# Patient Record
Sex: FEMALE | Race: WHITE | NOT HISPANIC OR LATINO | Employment: FULL TIME | URBAN - METROPOLITAN AREA
[De-identification: names, ages, dates, MRNs, and addresses within clinical notes are randomized per-mention and may not be internally consistent; named-entity substitution may affect disease eponyms.]

---

## 2019-07-17 ENCOUNTER — HOSPITAL ENCOUNTER (OUTPATIENT)
Dept: ULTRASOUND IMAGING | Facility: CLINIC | Age: 39
Discharge: HOME | End: 2019-07-17
Attending: ADVANCED PRACTICE MIDWIFE

## 2019-07-17 ENCOUNTER — PRENATAL OFFICE VISIT - HEALTHEAST (OUTPATIENT)
Dept: MIDWIFE SERVICES | Facility: CLINIC | Age: 39
End: 2019-07-17

## 2019-07-17 DIAGNOSIS — O09.529 SUPERVISION OF HIGH-RISK PREGNANCY OF ELDERLY MULTIGRAVIDA: ICD-10-CM

## 2019-07-17 DIAGNOSIS — O09.521 MULTIGRAVIDA OF ADVANCED MATERNAL AGE IN FIRST TRIMESTER: ICD-10-CM

## 2019-07-17 LAB
BASOPHILS # BLD AUTO: 0 THOU/UL (ref 0–0.2)
BASOPHILS NFR BLD AUTO: 0 % (ref 0–2)
EOSINOPHIL # BLD AUTO: 0.1 THOU/UL (ref 0–0.4)
EOSINOPHIL NFR BLD AUTO: 1 % (ref 0–6)
ERYTHROCYTE [DISTWIDTH] IN BLOOD BY AUTOMATED COUNT: 12.9 % (ref 11–14.5)
HCT VFR BLD AUTO: 38.7 % (ref 35–47)
HGB BLD-MCNC: 13.2 G/DL (ref 12–16)
HIV 1+2 AB+HIV1 P24 AG SERPL QL IA: NEGATIVE
LYMPHOCYTES # BLD AUTO: 2.3 THOU/UL (ref 0.8–4.4)
LYMPHOCYTES NFR BLD AUTO: 19 % (ref 20–40)
MCH RBC QN AUTO: 32.2 PG (ref 27–34)
MCHC RBC AUTO-ENTMCNC: 34.1 G/DL (ref 32–36)
MCV RBC AUTO: 94 FL (ref 80–100)
MONOCYTES # BLD AUTO: 1 THOU/UL (ref 0–0.9)
MONOCYTES NFR BLD AUTO: 8 % (ref 2–10)
NEUTROPHILS # BLD AUTO: 8.5 THOU/UL (ref 2–7.7)
NEUTROPHILS NFR BLD AUTO: 72 % (ref 50–70)
PLATELET # BLD AUTO: 240 THOU/UL (ref 140–440)
PMV BLD AUTO: 11.7 FL (ref 8.5–12.5)
RBC # BLD AUTO: 4.1 MILL/UL (ref 3.8–5.4)
WBC: 11.9 THOU/UL (ref 4–11)

## 2019-07-17 ASSESSMENT — MIFFLIN-ST. JEOR: SCORE: 1766.56

## 2019-07-18 LAB
ABO/RH(D): NORMAL
ABORH REPEAT: NORMAL
ANTIBODY SCREEN: NEGATIVE
HBA1C MFR BLD: 4.7 % (ref 4.2–6.1)
HBV SURFACE AG SERPL QL IA: NEGATIVE
HPV SOURCE: NORMAL
HUMAN PAPILLOMA VIRUS 16 DNA: NEGATIVE
HUMAN PAPILLOMA VIRUS 18 DNA: NEGATIVE
HUMAN PAPILLOMA VIRUS FINAL DIAGNOSIS: NORMAL
HUMAN PAPILLOMA VIRUS OTHER HR: NEGATIVE
RUBV IGG SERPL QL IA: POSITIVE
SPECIMEN DESCRIPTION: NORMAL
T PALLIDUM AB SER QL: NEGATIVE

## 2019-07-19 LAB — BACTERIA SPEC CULT: NORMAL

## 2019-07-29 ENCOUNTER — PRENATAL OFFICE VISIT - HEALTHEAST (OUTPATIENT)
Dept: MIDWIFE SERVICES | Facility: CLINIC | Age: 39
End: 2019-07-29

## 2019-07-29 ENCOUNTER — RECORDS - HEALTHEAST (OUTPATIENT)
Dept: ADMINISTRATIVE | Facility: OTHER | Age: 39
End: 2019-07-29

## 2019-07-29 DIAGNOSIS — O09.529 SUPERVISION OF HIGH-RISK PREGNANCY OF ELDERLY MULTIGRAVIDA: ICD-10-CM

## 2019-08-02 ENCOUNTER — AMBULATORY - HEALTHEAST (OUTPATIENT)
Dept: MIDWIFE SERVICES | Facility: CLINIC | Age: 39
End: 2019-08-02

## 2019-08-02 DIAGNOSIS — Z13.79 GENETIC SCREENING: ICD-10-CM

## 2019-08-08 ENCOUNTER — COMMUNICATION - HEALTHEAST (OUTPATIENT)
Dept: ADMINISTRATIVE | Facility: CLINIC | Age: 39
End: 2019-08-08

## 2019-08-20 ENCOUNTER — PRENATAL OFFICE VISIT - HEALTHEAST (OUTPATIENT)
Dept: MIDWIFE SERVICES | Facility: CLINIC | Age: 39
End: 2019-08-20

## 2019-08-20 DIAGNOSIS — O09.529 SUPERVISION OF HIGH-RISK PREGNANCY OF ELDERLY MULTIGRAVIDA: ICD-10-CM

## 2019-08-20 DIAGNOSIS — Z13.79 GENETIC SCREENING: ICD-10-CM

## 2019-08-20 ASSESSMENT — MIFFLIN-ST. JEOR: SCORE: 1788.79

## 2019-08-21 ENCOUNTER — COMMUNICATION - HEALTHEAST (OUTPATIENT)
Dept: MIDWIFE SERVICES | Facility: CLINIC | Age: 39
End: 2019-08-21

## 2019-08-21 DIAGNOSIS — O09.529 SUPERVISION OF HIGH-RISK PREGNANCY OF ELDERLY MULTIGRAVIDA: ICD-10-CM

## 2019-08-24 LAB
# FETUSES US: NORMAL
AFP MOM SERPL: 0.73
AFP SERPL-MCNC: 15 NG/ML
AGE - REPORTED: 37.6 YR
CURRENT SMOKER: NO
FAMILY MEMBER DISEASES HX: NO
GA METHOD: NORMAL
GA: NORMAL WK
IDDM PATIENT QL: NO
INTEGRATED SCN PATIENT-IMP: NORMAL
SPECIMEN DRAWN SERPL: NORMAL

## 2019-09-18 ENCOUNTER — COMMUNICATION - HEALTHEAST (OUTPATIENT)
Dept: ADMINISTRATIVE | Facility: CLINIC | Age: 39
End: 2019-09-18

## 2019-09-24 ENCOUNTER — RECORDS - HEALTHEAST (OUTPATIENT)
Dept: ADMINISTRATIVE | Facility: OTHER | Age: 39
End: 2019-09-24

## 2019-09-25 ENCOUNTER — PRENATAL OFFICE VISIT - HEALTHEAST (OUTPATIENT)
Dept: MIDWIFE SERVICES | Facility: CLINIC | Age: 39
End: 2019-09-25

## 2019-09-25 DIAGNOSIS — E66.3 OVERWEIGHT: ICD-10-CM

## 2019-09-25 DIAGNOSIS — O09.529 SUPERVISION OF HIGH-RISK PREGNANCY OF ELDERLY MULTIGRAVIDA: ICD-10-CM

## 2019-09-25 ASSESSMENT — MIFFLIN-ST. JEOR: SCORE: 1802.39

## 2019-09-26 ENCOUNTER — AMBULATORY - HEALTHEAST (OUTPATIENT)
Dept: MIDWIFE SERVICES | Facility: CLINIC | Age: 39
End: 2019-09-26

## 2019-10-22 ENCOUNTER — PRENATAL OFFICE VISIT - HEALTHEAST (OUTPATIENT)
Dept: MIDWIFE SERVICES | Facility: CLINIC | Age: 39
End: 2019-10-22

## 2019-10-22 DIAGNOSIS — Z67.91 RH NEGATIVE STATE IN ANTEPARTUM PERIOD: ICD-10-CM

## 2019-10-22 DIAGNOSIS — O09.529 SUPERVISION OF HIGH-RISK PREGNANCY OF ELDERLY MULTIGRAVIDA: ICD-10-CM

## 2019-10-22 DIAGNOSIS — O26.899 RH NEGATIVE STATE IN ANTEPARTUM PERIOD: ICD-10-CM

## 2019-10-22 DIAGNOSIS — E66.3 OVERWEIGHT: ICD-10-CM

## 2019-10-22 ASSESSMENT — MIFFLIN-ST. JEOR: SCORE: 1793.32

## 2019-11-22 ENCOUNTER — PRENATAL OFFICE VISIT - HEALTHEAST (OUTPATIENT)
Dept: MIDWIFE SERVICES | Facility: CLINIC | Age: 39
End: 2019-11-22

## 2019-11-22 DIAGNOSIS — M54.9 BACK PAIN AFFECTING PREGNANCY IN SECOND TRIMESTER: ICD-10-CM

## 2019-11-22 DIAGNOSIS — O26.899 RH NEGATIVE STATE IN ANTEPARTUM PERIOD: ICD-10-CM

## 2019-11-22 DIAGNOSIS — Z67.91 RH NEGATIVE STATE IN ANTEPARTUM PERIOD: ICD-10-CM

## 2019-11-22 DIAGNOSIS — O09.529 SUPERVISION OF HIGH-RISK PREGNANCY OF ELDERLY MULTIGRAVIDA: ICD-10-CM

## 2019-11-22 DIAGNOSIS — O99.891 BACK PAIN AFFECTING PREGNANCY IN SECOND TRIMESTER: ICD-10-CM

## 2019-11-22 LAB
FASTING STATUS PATIENT QL REPORTED: NORMAL
GLUCOSE 1H P 50 G GLC PO SERPL-MCNC: 112 MG/DL (ref 70–139)
HGB BLD-MCNC: 11.5 G/DL (ref 12–16)

## 2019-11-22 ASSESSMENT — MIFFLIN-ST. JEOR: SCORE: 1816

## 2019-11-23 LAB
ANTIBODY SCREEN: NEGATIVE
T PALLIDUM AB SER QL: NEGATIVE

## 2019-12-19 ENCOUNTER — PRENATAL OFFICE VISIT - HEALTHEAST (OUTPATIENT)
Dept: MIDWIFE SERVICES | Facility: CLINIC | Age: 39
End: 2019-12-19

## 2019-12-19 DIAGNOSIS — O09.529 SUPERVISION OF HIGH-RISK PREGNANCY OF ELDERLY MULTIGRAVIDA: ICD-10-CM

## 2019-12-19 ASSESSMENT — MIFFLIN-ST. JEOR: SCORE: 1823.71

## 2019-12-26 ENCOUNTER — COMMUNICATION - HEALTHEAST (OUTPATIENT)
Dept: MIDWIFE SERVICES | Facility: CLINIC | Age: 39
End: 2019-12-26

## 2020-01-02 ENCOUNTER — PRENATAL OFFICE VISIT - HEALTHEAST (OUTPATIENT)
Dept: MIDWIFE SERVICES | Facility: CLINIC | Age: 40
End: 2020-01-02

## 2020-01-02 DIAGNOSIS — O09.529 SUPERVISION OF HIGH-RISK PREGNANCY OF ELDERLY MULTIGRAVIDA: ICD-10-CM

## 2020-01-02 ASSESSMENT — EDINBURGH POSTNATAL DEPRESSION SCALE (EPDS): TOTAL SCORE: 1

## 2020-01-02 ASSESSMENT — MIFFLIN-ST. JEOR: SCORE: 1821.89

## 2020-01-23 ENCOUNTER — PRENATAL OFFICE VISIT - HEALTHEAST (OUTPATIENT)
Dept: MIDWIFE SERVICES | Facility: CLINIC | Age: 40
End: 2020-01-23

## 2020-01-23 DIAGNOSIS — O09.529 SUPERVISION OF HIGH-RISK PREGNANCY OF ELDERLY MULTIGRAVIDA: ICD-10-CM

## 2020-01-23 LAB — HGB BLD-MCNC: 11.3 G/DL (ref 12–16)

## 2020-01-23 ASSESSMENT — MIFFLIN-ST. JEOR: SCORE: 1846.38

## 2020-01-25 LAB
ALLERGIC TO PENICILLIN: NO
GP B STREP DNA SPEC QL NAA+PROBE: NEGATIVE

## 2020-01-30 ENCOUNTER — PRENATAL OFFICE VISIT - HEALTHEAST (OUTPATIENT)
Dept: MIDWIFE SERVICES | Facility: CLINIC | Age: 40
End: 2020-01-30

## 2020-01-30 DIAGNOSIS — O09.529 SUPERVISION OF HIGH-RISK PREGNANCY OF ELDERLY MULTIGRAVIDA: ICD-10-CM

## 2020-01-30 ASSESSMENT — MIFFLIN-ST. JEOR: SCORE: 1844.11

## 2020-02-06 ENCOUNTER — PRENATAL OFFICE VISIT - HEALTHEAST (OUTPATIENT)
Dept: MIDWIFE SERVICES | Facility: CLINIC | Age: 40
End: 2020-02-06

## 2020-02-06 DIAGNOSIS — O09.529 SUPERVISION OF HIGH-RISK PREGNANCY OF ELDERLY MULTIGRAVIDA: ICD-10-CM

## 2020-02-06 ASSESSMENT — MIFFLIN-ST. JEOR: SCORE: 1858.18

## 2020-02-13 ENCOUNTER — PRENATAL OFFICE VISIT - HEALTHEAST (OUTPATIENT)
Dept: MIDWIFE SERVICES | Facility: CLINIC | Age: 40
End: 2020-02-13

## 2020-02-13 DIAGNOSIS — O48.0 POST-TERM PREGNANCY, 40-42 WEEKS OF GESTATION: ICD-10-CM

## 2020-02-13 DIAGNOSIS — O09.529 SUPERVISION OF HIGH-RISK PREGNANCY OF ELDERLY MULTIGRAVIDA: ICD-10-CM

## 2020-02-13 ASSESSMENT — MIFFLIN-ST. JEOR: SCORE: 1848.65

## 2020-02-18 ENCOUNTER — PRENATAL OFFICE VISIT - HEALTHEAST (OUTPATIENT)
Dept: MIDWIFE SERVICES | Facility: CLINIC | Age: 40
End: 2020-02-18

## 2020-02-18 ENCOUNTER — HOSPITAL ENCOUNTER (OUTPATIENT)
Dept: ULTRASOUND IMAGING | Facility: CLINIC | Age: 40
Discharge: HOME | End: 2020-02-18
Attending: ADVANCED PRACTICE MIDWIFE

## 2020-02-18 DIAGNOSIS — O48.0 POST-TERM PREGNANCY, 40-42 WEEKS OF GESTATION: ICD-10-CM

## 2020-02-18 DIAGNOSIS — O09.529 SUPERVISION OF HIGH-RISK PREGNANCY OF ELDERLY MULTIGRAVIDA: ICD-10-CM

## 2020-02-18 ASSESSMENT — MIFFLIN-ST. JEOR: SCORE: 1851.82

## 2020-02-20 ENCOUNTER — COMMUNICATION - HEALTHEAST (OUTPATIENT)
Dept: OBGYN | Facility: CLINIC | Age: 40
End: 2020-02-20

## 2020-02-29 ENCOUNTER — COMMUNICATION - HEALTHEAST (OUTPATIENT)
Dept: MIDWIFE SERVICES | Facility: CLINIC | Age: 40
End: 2020-02-29

## 2020-03-02 ENCOUNTER — COMMUNICATION - HEALTHEAST (OUTPATIENT)
Dept: MIDWIFE SERVICES | Facility: CLINIC | Age: 40
End: 2020-03-02

## 2020-03-02 ENCOUNTER — COMMUNICATION - RIVER FALLS (OUTPATIENT)
Dept: FAMILY MEDICINE | Facility: CLINIC | Age: 40
End: 2020-03-02

## 2020-03-03 ENCOUNTER — RECORDS - HEALTHEAST (OUTPATIENT)
Dept: ADMINISTRATIVE | Facility: OTHER | Age: 40
End: 2020-03-03

## 2020-03-03 ENCOUNTER — COMMUNICATION - HEALTHEAST (OUTPATIENT)
Dept: MIDWIFE SERVICES | Facility: CLINIC | Age: 40
End: 2020-03-03

## 2020-03-04 ENCOUNTER — COMMUNICATION - HEALTHEAST (OUTPATIENT)
Dept: MIDWIFE SERVICES | Facility: CLINIC | Age: 40
End: 2020-03-04

## 2020-03-05 ENCOUNTER — COMMUNICATION - HEALTHEAST (OUTPATIENT)
Dept: MIDWIFE SERVICES | Facility: CLINIC | Age: 40
End: 2020-03-05

## 2020-03-05 ENCOUNTER — OFFICE VISIT - HEALTHEAST (OUTPATIENT)
Dept: MIDWIFE SERVICES | Facility: CLINIC | Age: 40
End: 2020-03-05

## 2020-03-05 RX ORDER — DIAPER,BRIEF,ADULT, DISPOSABLE
1 EACH MISCELLANEOUS DAILY
Status: SHIPPED | COMMUNITY
Start: 2020-03-05 | End: 2022-10-21

## 2020-03-05 RX ORDER — PYRIDOXINE HCL (VITAMIN B6) 25 MG
50 TABLET ORAL DAILY
Status: SHIPPED | COMMUNITY
Start: 2020-03-05 | End: 2022-10-21

## 2020-03-05 ASSESSMENT — EDINBURGH POSTNATAL DEPRESSION SCALE (EPDS): TOTAL SCORE: 0

## 2020-03-05 ASSESSMENT — MIFFLIN-ST. JEOR: SCORE: 1763.37

## 2020-03-06 ENCOUNTER — COMMUNICATION - RIVER FALLS (OUTPATIENT)
Dept: FAMILY MEDICINE | Facility: CLINIC | Age: 40
End: 2020-03-06

## 2020-03-10 ENCOUNTER — COMMUNICATION - HEALTHEAST (OUTPATIENT)
Dept: MIDWIFE SERVICES | Facility: CLINIC | Age: 40
End: 2020-03-10

## 2020-03-30 ENCOUNTER — COMMUNICATION - RIVER FALLS (OUTPATIENT)
Dept: FAMILY MEDICINE | Facility: CLINIC | Age: 40
End: 2020-03-30

## 2020-04-02 ENCOUNTER — OFFICE VISIT - HEALTHEAST (OUTPATIENT)
Dept: MIDWIFE SERVICES | Facility: CLINIC | Age: 40
End: 2020-04-02

## 2020-04-02 DIAGNOSIS — Z30.8 ENCOUNTER FOR OTHER CONTRACEPTIVE MANAGEMENT: ICD-10-CM

## 2020-04-02 DIAGNOSIS — Z67.91 RH NEGATIVE, ANTEPARTUM: ICD-10-CM

## 2020-04-02 DIAGNOSIS — O26.899 RH NEGATIVE, ANTEPARTUM: ICD-10-CM

## 2020-04-02 ASSESSMENT — ANXIETY QUESTIONNAIRES
3. WORRYING TOO MUCH ABOUT DIFFERENT THINGS: NOT AT ALL
4. TROUBLE RELAXING: NOT AT ALL
1. FEELING NERVOUS, ANXIOUS, OR ON EDGE: NOT AT ALL
5. BEING SO RESTLESS THAT IT IS HARD TO SIT STILL: NOT AT ALL
7. FEELING AFRAID AS IF SOMETHING AWFUL MIGHT HAPPEN: NOT AT ALL
6. BECOMING EASILY ANNOYED OR IRRITABLE: NOT AT ALL
GAD7 TOTAL SCORE: 0
2. NOT BEING ABLE TO STOP OR CONTROL WORRYING: NOT AT ALL

## 2020-04-02 ASSESSMENT — EDINBURGH POSTNATAL DEPRESSION SCALE (EPDS): TOTAL SCORE: 0

## 2021-05-28 ASSESSMENT — ANXIETY QUESTIONNAIRES: GAD7 TOTAL SCORE: 0

## 2021-05-30 NOTE — PROGRESS NOTES
PRENATAL VISIT   FIRST OBSTETRICAL EXAM - OB    Assessment / Impression     First prenatal visit at 10w3d by LMP     AMA  Hx LGA infant    Plan:     - IOB labs drawn., Declines GC/CT screening,  Pap smear screening performed today and hgbA1C  -Pt is not interested in drawing lead level.  -Patient is not interested in waterbirth. Hep C not drawn today.  -Reviewed prenatal care schedule and discussed routine OB visits versus problem visits and referrals. Also discussed use of ultrasound in pregnancy.  -ordered early US; reviewed dating. Dating by LMP.   -Optimal nutrition and weight gain discussed. Pregnancy weight gain of 11-20 lbs (BMI 30.0 or 34.9) encouraged.   -Advanced Maternal Age (35-39 years of age), advised on options for ultrasounds and genetic screening. Accepts all of the above offered.  -Reviewed importance of regular exercise in pregnancy and help with low back pain and other pregnancy symptoms.   -Discussed importance of taking aprenatal vitamin with the goal of having 400 mcg of folic acid. Additionally taking a Vitamin D supplement (1,000-2,000 IU/day) and an Omega 3/fish oil/DHA is beneficial.   -Anticipatory guidance for common pregnancy questions and concerns reviewed.   -Danger s/sx for this trimester reviewed with patient.  -Reviewed genetic carrier screening. Patient declines: all.  -Reviewed genetic aneuploidy screening options. Patient  accepts: NIPTS. Encouraged to call insurance to verify coverage.  -Reviewed MyChart, lab results, and how lab results are disseminated.   -IOB packet given and reviewed with patient, discussed standards of care, scope of practice, clinic and hospital settings, also reviewed clinic versus emergency phone number.   -Discussed [facility], policies, and recommendations regarding breastfeeding as well as professional and community support. Discussed the benefits of breastfeeding including: decreased childhood obesity or diabetes, decreased recurrence  "of ear infections, and decreased chance of hospitalization for respiratory conditions  Additionally, giving  formula instead of breast milk can affect the mother's supply. And formula alters the natural growth of good bacteria in the 's stomach.   -CNM services and hospital options reviewed; emergency and scheduling phone numbers given to patient.  -Return to clinic 4-6 weeks    Total time spent with patient: 40 minutes, >50% time spent counseling and coordinating care.    Subjective:      Mar Kaur is a 39 y.o.  here today for her First Obstetrical Exam.  She is new to [facility] and [facility]. This is a planned pregnancy.  Her cycles are regular and come every 28-30 days.  She is feeling well.  First pregnancy was uncomplicated.  She had spontaneous labor, used an epidural and \"got stuck\" at advanced dilation so pitocin was used.  Her birth was uncomplicated.  Believes she had undiagnosed and untreated PPA.  Notes she is watchful and aware of sxms and has much more support now.  Thinks they are in a better place with this pregnancy.  First child was 9#.  Eats healthy diet: complex carbs, fruit, veggies, protein heavy.  EPDS 5 today.        Exercise: 5x/week  Safety (seatbelt, gun access, IPV, hx abuse): denies IPV or other safety issues  Tobacco/Alcohol/Drug Use: denies  EPDS today: 5  Sexual Partners: 1, male  Last Breast Exam: unsure     first child  Feeding Plans Breastfeeding.    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  Desires to do it today.    OB History    Para Term  AB Living   2 1 1     1   SAB TAB Ectopic Multiple Live Births         0 1      # Outcome Date GA Lbr Mateus/2nd Weight Sex Delivery Anes PTL Lv   2 Current            1 Term 16 40w4d 10:36 / 01:06 9 lb 1.2 oz (4.115 kg) M Vag-Spont EPI N DIVYA       Expected Date of " Delivery: 2020, by Last Menstrual Period    Past Medical History:   Diagnosis Date     Breast disorder     Had benign breast lump removed      Genital warts      Postpartum depression      Rh incompatibility      Trauma      Varicella     childhood     Past Surgical History:   Procedure Laterality Date     BREAST BIOPSY Right 2008     RI BIOPSY OF BREAST, INCISIONAL      Description: Biopsy Breast Open;  Recorded: 2009;  Comments: fibroadenoma     RI REMV BENIGN FEMUR LESION      Description: Curettage Of Femur;  Recorded: 2009;  Comments: abnormal bony lesion L femur     Social History     Tobacco Use     Smoking status: Former Smoker     Packs/day: 0.25     Years: 2.00     Pack years: 0.50     Last attempt to quit: 2000     Years since quittin.4     Smokeless tobacco: Never Used   Substance Use Topics     Alcohol use: No     Drug use: No     Current Outpatient Medications   Medication Sig Dispense Refill     calcium carbonate-vitamin D3 (OS-HENRIK 250+ D) 250-125 mg-unit Tab per tablet Take 1 tablet by mouth 2 (two) times a day.       docoshexanoic acid-eicosapent 500 mg (FISH OIL) 500-100 mg cap capsule Take 500 mg by mouth 2 (two) times a day at 9am and 6pm.       prenatal vitamin iron-folic acid 27mg-0.8mg (PRENATAL S) 27 mg iron- 800 mcg Tab tablet Take 1 tablet by mouth 2 (two) times a day.       No current facility-administered medications for this visit.      No Known Allergies          Pregnancy Risk Factors: Age is <18 or >35    Review of Systems  General:  Denies problem  Eyes: Denies problem  Ears/Nose/Throat: Denies problem  Cardiovascular: Denies problem  Respiratory:  Denies problem  Gastrointestinal:  Denies problem  Genitourinary: Denies problem  Musculoskeletal:  Denies problem  Skin: Denies problem  Neurologic: Denies problem  Psychiatric: Denies problem  Endocrine: Denies problem  Heme/Lymphatic: Denies problem   Allergic/Immunologic: Denies problem       Objective:  "  Objective    Vitals:    07/17/19 1634   BP: 130/86   Pulse: 72   Weight: (!) 227 lb 1.6 oz (103 kg)   Height: 5' 8.5\" (1.74 m)     Physical Exam:  General Appearance: Alert, cooperative, no distress, appears stated age  Head: Normocephalic, without obvious abnormality, atraumatic  Eyes: Conjunctiva/corneas clear  Neck: Supple, symmetrical, trachea midline, no adenopathy  Thyroid: not enlarged, symmetric, no tenderness/mass/nodules  Back: Symmetric, no curvature, ROM normal, no CVA tenderness  Lungs: Clear to auscultation bilaterally, respirations unlabored  Heart: Regular rate and rhythm, S1 and S2 normal, no murmur, rub, or gallop  Breasts:  No breast masses, tenderness, asymmetry, or nipple discharge. Nipples are everted everted.   Abdomen: Soft, non-tender, no masses. Gravid to 10  FHT: 160 via BSUS   Pelvic exam: External genitalia normal without lesions or irritation. Vagina and cervix with physiologic discharge show no lesions, inflammation, or tenderness. No cystocele, No rectocele. Uterus & adnexal normal without masses or tenderness   Extremities: Extremities normal, atraumatic, no cyanosis or edema  Skin: Skin color, texture, turgor normal, no rashes or lesions  Lymph nodes: Cervical, supraclavicular, and axillary nodes normal  Neurologic: Alert and oriented x 3.    Lab:   Results for orders placed or performed in visit on 07/17/19   HML ABO/Rh Typing (Type and Screen for outpatient)   Result Value Ref Range    HML ABO/Rh Typing B NEG     HML ABO/Rh Repeat Typing B NEG    Hepatitis B Surface Antigen (HBsAG)   Result Value Ref Range    Hepatitis B Surface Ag Negative Negative   HML Antibody Screen   Result Value Ref Range    HML Antibody Screen Negative Negative   Rubella Antibody, IgG   Result Value Ref Range    Rubella Antibody, IgG Positive    Syphilis Screen, Cascade   Result Value Ref Range    Treponema Antibody (Syphilis) Negative Negative   HIV Antigen/Antibody Screening Cascade   Result Value Ref " Range    HIV Antigen / Antibody Negative Negative   Glycosylated Hemoglobin A1c   Result Value Ref Range    Hemoglobin A1c 4.7 4.2 - 6.1 %   HM1 (CBC with Diff)   Result Value Ref Range    WBC 11.9 (H) 4.0 - 11.0 thou/uL    RBC 4.10 3.80 - 5.40 mill/uL    Hemoglobin 13.2 12.0 - 16.0 g/dL    Hematocrit 38.7 35.0 - 47.0 %    MCV 94 80 - 100 fL    MCH 32.2 27.0 - 34.0 pg    MCHC 34.1 32.0 - 36.0 g/dL    RDW 12.9 11.0 - 14.5 %    Platelets 240 140 - 440 thou/uL    MPV 11.7 8.5 - 12.5 fL    Neutrophils % 72 (H) 50 - 70 %    Lymphocytes % 19 (L) 20 - 40 %    Monocytes % 8 2 - 10 %    Eosinophils % 1 0 - 6 %    Basophils % 0 0 - 2 %    Neutrophils Absolute 8.5 (H) 2.0 - 7.7 thou/uL    Lymphocytes Absolute 2.3 0.8 - 4.4 thou/uL    Monocytes Absolute 1.0 (H) 0.0 - 0.9 thou/uL    Eosinophils Absolute 0.1 0.0 - 0.4 thou/uL    Basophils Absolute 0.0 0.0 - 0.2 thou/uL

## 2021-05-30 NOTE — PATIENT INSTRUCTIONS - HE
For insurance or billing questions about Progenity testing, please call:  Mathew Lea [phone]    For questions about your results, please call a Progenity board-certified genetic counselor JESUS 8am-8pm:  [phone], option [phone]    Welcome to [facility] and thank you for choosing us for your maternity care provider!  Congratulations!    [facility] - Contact information:  Appointment line and to get a hold of CNM in clinic Monday-Friday 8 am - 5 pm:  [phone].  There are some clinics with early start times (1st appointment 7:40 am) and others with evening hours (last appointment 6:20 pm).  Most are typically open from 8 am to 5 pm.    CNM on call answering service: [phone].  Specify your hospital of choice and leave a brief message for CNM;  will then page CNM who is on call at your specified hospital and you should receive a call back with 15 minutes.  Be sure that your ringer is audible and that you can accept blocked calls so that we can get back in touch with you! This number should be reserved for urgent needs if during the day, before 8 am, after 5 pm, weekends, holidays.      Pregnancy: Body Changes  From conception (fertilization) until after the birth of your child, you and your baby will change every day. To help you understand what is happening, we ve outlined how pregnancy begins and some of the changes you may notice.  How Pregnancy Begins  Conception is the union of a sperm and an egg. When it occurs, your baby s genetic makeup is complete, even its sex. Fertilization takes place in the fallopian tube. The fertilized egg then travels down this tube to the uterus (womb). The egg attaches to the lining of the uterus about a week later. There it grows and is nourished.    Your Changing Body  Pregnancy affects almost every part of your body. You may notice some of the following physical and emotional changes:    Your uterus expands outward and upward as your  baby grows. You may feel pressure on your bladder, stomach, and other organs.    You may notice skin color changes on your forehead, nose, and cheeks. A dark line may form from your bellybutton down to your pubic area. The skin color around your nipples and thighs may also change.    Pink stretch marks may appear on your abdomen, breasts, or hips.    Your hair may seem thicker. You lose less hair during pregnancy.    You may feel fine one day and weepy the next. This is caused by changes in your body, such as increased hormones (chemicals that affect the function of certain organs and also your moods).      Adapting to Pregnancy: First Trimester  As your body adjusts, you may have to change or limit your daily activities. You ll need more rest. You may also need to use the energy you have more wisely.  Eat stomach-friendly foods like cottage cheese, crackers, or bread throughout the day.    Your Changing Body  Almost every part of your body is affected as you adapt to pregnancy. The uterus and cervix will begin to soften right away. You may not look very pregnant during the first three months. But you are likely to have some common signs of early pregnancy:    Nausea    Fatigue    Frequent urination    Mood swings    Bloating of the abdomen    Missed or light periods (first trimester bleeding)    Nipple or breast tenderness, breast swelling      It s Not Too Late to Start Good Habits  What matters most is protecting your baby from this moment on. If you smoke, drink alcohol, or use drugs, now is the time to stop. If you need help, talk with your health care provider.    Smoking increases the risk of stillbirth or having a low-birth-weight baby. If you smoke, quit now.    Alcohol and drugs have been linked with miscarriage, birth defects, intellectual disability, and low birth weight. Do not drink alcohol or take drugs.    Tips to Relieve Nausea  Although nausea can occur at any time of the day, it may be worse in  the morning. To help prevent nausea:    Eat small, light meals at frequent intervals.    Get up slowly. Eat a few unsalted crackers before you get out of bed.    Drink water with lemon slices.    Eat an ice pop in your favorite flavor.    Ask your health care provider about taking giovany or vitamin B6 for nausea and vomiting.    Talk with your health care provider if you take vitamins that upset your stomach.    Work Concerns  The end of the first trimester is a good time to discuss working during pregnancy with your employer. Follow your health care provider s advice if your job requires you to stand for a long time, work with hazardous tools, or even sit at a desk all day. Your workspace, workload, or scheduled hours may need to be adjusted. Perhaps you can change body postures more often or take an extra break.  Advice for Travel  Talk to your health care provider first, but the second trimester may be the best time for any travel. You may be advised to avoid certain trips while you re pregnant. Food and water can be concerns in developing countries. Travel by car is a good choice, as you can stop, get out, and stretch. Bring snacks and water along. Fasten the lap belt below your belly, low over your hips. Also be sure to wear the shoulder harness.  Intimacy  Unless your health care provider tells you to, there is no reason to stop having sex while you re pregnant. You or your partner may notice changes in desire. Desire may be less in the first trimester, due to nausea and fatigue. In the second trimester, sex may be very enjoyable. The third trimester can be a challenge comfort-wise. Try different positions and see what s best for you both.      Pregnancy: Your First Trimester Changes  The first trimester is a time of rapid development for your baby. Because your baby is growing so quickly, it is important that you start a healthy lifestyle right away. By the end of the first trimester, your baby has formed all  of its major body organs and weighs just over an ounce.    Month 1 (Weeks 1-4)  The placenta (the organ that nourishes your baby) begins to form. The heart and lungs begin to develop. Your baby is about 1/4 inch long by the end of the first month.    Month 2 (Weeks 5-8)  All of your baby s major body organs form. The face, fingers, toes, ears, and eyes appear. By the end of the month, your baby is about 1 inch long.    Month 3 (Weeks 9-12)  Your baby can open and close its fists and mouth. The sexual organs begin to form. As the first trimester ends, your baby is about 4 inches long.      Pregnancy: Your Weight  Being a healthy weight is important for both you and your baby. The weight you gain now is not just extra fat. It is also the weight of your baby. And it is the increased blood and fluids to support the baby. A slow, steady rate of gain is best. How much you should gain depends on your weight before getting pregnant. Check with your health care provider to find out what is right for you.    If You Gain Too Much  Gaining too much weight might cause you to feel tired or you could have a harder pregnancy or birth. If you and your health care provider decide you re gaining too much:    Eat fewer fats and sugars. Instead, eat fruit, vegetables, and whole-grain foods.    Drink plenty of water between meals.    Get at least 20 minutes of light exercise, such as walking, each day.    Don t diet. You might not get enough of the nutrients you or your baby needs.    Keep a diet diary to help you gauge what and how much you are eating .    If You re Not Gaining Enough  If you don t gain enough, your baby could be too small or have health problems. Women tend to gain most of their weight in the second and third trimesters. For now:    Eat many types of foods. Make sure you get enough calcium, protein, and carbohydrates.    Don t skip meals.    Eat healthy snacks.    Pick nutrient-dense, high calorie healthy food like  trail mix or protein shakes.    See a dietitian for help.    Talk to your healthcare provider if you have had an eating disorder or problems with certain foods.      Pregnancy: Common Questions  There are plenty of myths and  old wives  tales  surrounding pregnancy. You may need help  fact from fiction. On this sheet, you ll find answers to a few common questions. If you have other questions, talk with a midwife.    Will Working Harm My Baby?  In most cases, working throughout your pregnancy is not harmful at all. There may be concerns if the job involves dangerous machinery or chemicals, lifting, or standing for very long periods of time. Talk to your health care provider and employer about your particular job and pregnancy.  Why Can t I Change the Cat Litter Box?  Cats carry a disease called toxoplasmosis. In adult humans, it shows up as a mild infection of the blood and organs. If you are infected during pregnancy, the baby s brain and eyes could be damaged. To be safe, have someone else change the litter. If you must handle it, wear a paper mask over your nose and mouth. Also, wear gloves and wash your hands afterward.  Which Medications Are Safe?  No prescription or over-the-counter drug is safe for everyone all of the time. But sometimes medications are needed. Be sure your health care provider knows you are pregnant. Then use only the medications he or she advises you to take. Please refer to the below resources for further information and discuss concern and questions with your midwife.  Is It True That I Can Overheat My Baby?  Yes. To avoid making your baby too warm:    Don t sit in a jacuzzi. A long, warm bath is fine, but not in water over 100 F.    Exercise less intensely if you feel fatigued. Base your workout on how you feel, not your heart rate. Heart rates aren t a good way to measure effort during pregnancy.  Can I Lift and Carry Safely?  Yes, if your health care provider doesn t tell you  otherwise. Learn to lift and carry safely to avoid injury and reduce back pain during pregnancy. To protect your back:    Bend at the knees to bring the load nearer.    Get a good . Test the weight of the load.    Tighten your abdomen. Exhale as you lift.    Lift with your legs, not with your back.    Carry the load close to your body.    Hold the load so you can see where you are going.  What If I Get Sick?  Most women get sick at least once during pregnancy. Talk with your health care provider if you do. Most likely it will not affect your pregnancy. Get plenty of rest and fluids, and eat what you can. Talk to your health care provider before taking any medications.        HEALTHY PREGNANCY CARE: 10-14 WEEKS PREGNANT     By weeks 10 to 14 of your pregnancy, the placenta has formed inside your uterus. It may be possible to hear your baby's heartbeat with a doppler ultrasound device. Your baby's eyes can and do move. The arms and legs can bend.    GENETIC SCREENING OPTIONS AT [facility]                All testing is optional. We don t recommend or discourage any test; it is totally up to you and your partner. Some couples wish to know their risk of having a baby with a genetic defect and others do not. We will support your decision. Abnormal results may lead to a discussion of options for further testing.    Accurate dating of your pregnancy is important for all testing so an ultrasound may be done prior to referral or testing.    No testing provides certainty; there are false positives and negatives associated with all testing, some more than others.    Most genetic testing is non-invasive (requires only a blood sample and sometimes an ultrasound or both).    It is always wise to check with your insurance carrier before proceeding.    Some testing can be done at our lab and some require a referral.    If you decide to do no testing, the 20 week ultrasound scan, which is a routine or standard ultrasound, has  some ability to detect abnormalities in the baby, and identifies obstetric problems.    If you are over 35, you will have the option of a Level II ultrasound, which is a more detailed and targeting scan, that helps detect fetal anomalies as well as obstetric problems.      If you have a more complex family history of chromosomal abnormalities, a referral to a genetic counselor and/or a Maternal Fetal Medicine specialist, to help identify available tests, may be recommended.    TYPES OF GENETIC TESTING AVAILABLE INCLUDE:    Carrier Screening/Testing for Genetic Conditions    There are many inherited conditions for which testing or carrier testing is available. Carrier screening can test for conditions like Cystic Fibrosis, Thalassemia, Rajeev Sachs, Sickle Cell, Hemophilia, Muscular Dystrophy, Rhianna s disease and many others.     Cystic Fibrosis (CF) affects both males and females and people from all racial and ethnic groups. However, the disease is most common among Caucasians of Northern  descent. CF is also common among Latinos and American Indians. The disease is less common among  Americans and  Americans. More than 10 million Americans are carriers of a faulty CF gene and many of them don't know that they are CF carriers. One or both parents can be tested any time before or during pregnancy.    Talk to your care provider about your family history and whether you should be screened. A referral to a genetic counselor at [facility] or [facility] can be made by your care provider at any time.    This is also tested for in the [place] Metabolic Screen that your infant receives 24 hours after birth.     Fetal DNA cell Testing: [place] or Innatal (Non-Invasive Prenatal Testing)    At 10 wk or greater, a blood sample can be drawn here at clinic or at any of our referral offices. It will provide highly accurate results with low false positive rates for trisomy 18, trisomy 21 (Down  Syndrome), and trisomy 13 (> 99% trisomy detection rate at a false positive rate of <0.1%). Gender identification can also be obtained if desired (98% accuracy).  Can also detect some sex-linked chromosomal abnormalities (80-90% accuracy).  This is often done if one of the other screening tests is abnormal.        1st Trimester Screening:     Everyone has an age related risk of having a baby with a genetic abnormality. This testing provides a risk profile which is better than assigning risk based solely on your age.    Refines your risk of having a baby with a chromosomal abnormality such as Trisomy 21 & 18.    This test with detect a fetus with one of these disorders about 85% of the time.    A thickened nuchal fold can also be associated with cardiac defects.    This test requires a blood collection combined with ultrasound to obtain a measurement of fluid at back of baby s neck (nuchal translucency).    False positive results occur approximately 5% of cases.    An additional blood sample may be necessary in order to calculate your risk of having a baby with a neural tube defect (e.g. spina bifida).  This is called the AFP test (alpha fetal protein).  An ultrasound should be able to  any spinal defect as well.    Follow-up of an abnormal test may include a more extensive ultrasound study and you may be offered an amniocentesis (a small sample of amniotic fluid is withdrawn and studied) for a definitive diagnosis    Quad Screen (4-marker screen)    Between 15 and 21 weeks, a sample of blood can be drawn at our lab to assess your risk of having a baby with Down Syndrome, Trisomy 18 and neural tube defects. Such testing is able to detect these conditions in 80% of cases and the false-positive rate is approximately 5%.     Follow-up of an abnormal test may include a more extensive ultrasound study and you may be offered an amniocentesis (a small sample of amniotic fluid is withdrawn and studied) for a  definitive diagnosis      Referrals opportunities include:    [facility],  [facility], [facility]  o Offers nuchal translucency ultrasound; does not offer genetic counseling.    [facility] and [facility] ([facility]):   o Approximately an hour long visit includes 30 min with genetic counselor who discusses all testing available and which ones might be beneficial to you based on age, personal and family history.    o Blood will be drawn and the nuchal translucency ultrasound will be discussed and performed if desired. The Free Fetal DNA testing ([place]/[employer]) can be drawn also.  o Targeted or detailed Level II ultrasounds are also available with these perinatology groups.        Breastfeeding: a Healthy Option for You and Your Baby  Consider breastfeeding for the healthiest way to feed your baby. Ask your midwife or physician for more information.     The choice of how you will feed your baby is important.  Before your baby s birth, you ll want to learn about the benefits of breastfeeding.  [place] have been designated Baby Friendly; an initiative that was created by the World Health Organization and UNICEF.  This helps give you and your baby the best start in feeding their baby.    Why should I breastfeed my baby?    Babies are less likely to develop childhood obesity or diabetes    Babies are less likely to suffer from recurrent ear infections    Babies are less likely to be hospitalized for respiratory conditions    Breast milk is rich in nutrients and antibodies-it is easy to digest    How does it benefit me?    Lowers the risk for diabetes, breast and ovarian cancer and postpartum depression    Moms can lose  baby weight  more quickly    Cost savings - formula can cost well over $1,500 per year    Convenient - no bottles and nipples to sterilize, no measuring and mixing formula    The physical contact with breastfeeding can make babies  feel secure, warm and comforted     What about formula?  While you and your baby are staying with us at [facility], we will support whatever feeding choice you make for your baby.    Some important considerations:      The American Academy of Pediatrics, the World Health Organization, and many more organizations recommend exclusive breastfeeding for 6 months and continued breastfeeding while adding other foods for the first 1-2 years.      Any amount of breastmilk has benefits to both baby and mother.    Giving formula in replacement of breastfeeding can affect mother s milk supply.  If formula is needed, hospital staff will work with you on a plan to help develop your milk supply.    Formula alters the natural growth of good bacteria in the  stomach.     Research has found that first time mothers who offer formula in the hospital have a shorter duration of breastfeeding.    How can I start to prepare?     Start by having a conversation with your medical provider.     Talk with your partner, family and friends.     Attend a prenatal class that includes breastfeeding preparation. Birth and breastfeeding classes are offered by [facility]. Visit [employer] for class information.     After your baby s birth, hospital staff and lactation consultants will help you and your baby get off to a great start with breastfeeding.    As your center of gravity and weight changes, use good body mechanics when changing positions and lifting. For example, use a straight back and your legs for support when lifting instead of bending over. Maintain good posture to prevent straining your muscles. Now is a good time to continue or restart your exercise program. Walking 30-60 minutes daily is an excellent way to keep fit. Yoga and swimming also offer many benefits.    The nausea and fatigue of early pregnancy have usually started to let up, so this is a good time to focus on nutrition. Consider attending a  nutrition class. A healthy diet includes about 60 grams of protein each day (3-4 servings of dairy, 2-3 servings of meat/fish/poultry/nuts), 4-6 servings of whole grain foods, and 5-6 servings of fruits and vegetables. Remember to drink 6-8 glasses of water daily.    Watch for warning signs, such as     vaginal bleeding    fluid leaking from your vagina    severe abdominal pain    nausea and vomiting more than 4-5 times a day, or if you are unable to keep anything down    fever more than 100.4 degrees F.       RESOURCES   You can refer to the Starting Out Right book or find it online at http://www.[facility]the[facility].org/images/stories/maternity/[facility]-Starting-Out-Right.pdf or http://www.[facility]althe[facility].org/images/stories/flipbooks/[facility]-starting-out-right/[facility]-starting-out-right.html#p=8    You can sign up for a weekly parenting e-mail that gives support, tips and advice from health care professionals that starts with pregnancy and continues through the toddler years. To register, go to [email or web address] at any time during your pregnancy.    Breastfeeding:    OUTPATIENT LACTATION RESOURCES    Baby Friendly Hospitals and clinics: https://[email or web address]/maternity/[email or web address]-maternity-care/baby-friendly.html       -Schedule an appointment with a [facility] CNM who is also a Lactation Consultant by calling [phone]     -Schedule an appointment with a [facility] CNM who is also a Lactation Consultant by calling [phone]. We see women for breastfeeding visits at [facility] and [facility] Tuesday - Thursday.     -Schedule an outpatient appointment with one of the [facility] Lactation Consultants at [facility] or [facility] by calling [phone]    -Attend a baby weigh in at [facility].  Lactation consultants are available to answer questions  [place]: Tuesdays 1:00 - 2:00  [facility]: Mondays 1:00 - 2:00   [email or web address]    -Attend one of the  New Mama groups at [facility] in [facility].  [facility] also offers one-on-one in home and in office lactation consults.   [email or web address]    -Attend a Mehdi Castañeda meeting.  Multiple groups in several locations throughout the [facility]. The meetings are no-cost and always informative breastfeeding education session through [employer] La [employer]eche League  Www.londas.org/    Childbirth and Parenting Education:   [facility]: [email or web address] [email or web address]/   ([phone]) 346-BABY  [place]: (education, yoga & wellness) www.[facility]  [facility]: www.[employer]   Childbirth collective: (Parent topic nights)  www.childbirthcollective.org/  Hypnobabies:  www.hypnobabiestwincities.[employer]/  Hypnobirthing:  Http://hypnobirthing.com/    Book Recommendations:   Rach Gonzales's Birthing From Within--first few chapters include a new-age tone, you may prefer to skip it and keep going, because there is good stuff later.  This book recommendation covers emotional preparation, but does cover coping with pain, and use of both pharmacological and nonpharmacological methods.    Dr. Nielsen' The Pregnancy Book and The Birth Book--the pregnancy book goes month-by month    Womanly Art of Breastfeeding by La Leche [employer] International   Bestfeeding by Nicolette Smith--great pictures    Mothering Your Nursing Toddler, by Noemy Chavez.   Addresses dealing with so many of the challenging behaviors of a nursing toddler.  How Weaning Happens, by La Leche League.  Discusses weaning at all ages, from medically necessary weaning of an infant, all the way up to age 5 (or older), with why/why not, and strategies.  Very empowering book both for deciding to wean and deciding not to.    American College of Nurse-Midwives (ACNM) http://www.midwife.org/; look at the informational handouts at http://www.midwife.org/Share-With-Women     www.mymidwife.org    Mother to Baby (Medication and Herbal guidance in pregnancy):  "http://www.mothertobaby.org  Toll-Free Hotline: 910.559.8374  LactMed (Medication use while breastfeeding): http://toxnet.nlm.nih.gov/newtoxnet/lactmed.htm    Women's Health.gov:  http://www.womenshealth.gov/a-z-topics/index.html    American pregnancy association - http://americanpregnancy.org    Centering Pregnancy (group prenatal care option): http://centeringhealthcare.org    Information about doulas:  Childbirth collective: http://www.childbirthcollective.org/  [facility]oulas of [place] ([place]):  www.norberto.org  [facility]  project: http://Fotouptiesdoulaproject.[employer]./     Early Childhood and Family Education (ECFE):  ECFE offers parents hands-on learning experiences that will nourish a lifetime of teachable moments.  http://ecfe.info/ecfe-home/    March of Dimes www.AllSchoolStuff.com     FDA - Nutrition  www.mypyramid.gov  Under \"For Consumers,\" click on \"pregnant and breastfeeding women.\"      Centers for Disease Control and Prevention (CDC) - Vaccines : http://www.cdc.gov/vaccines/       When researching information on the web, question the validity of websites.  The domains .gov, .edu and.org tend to be more reliable information.  If there are a lot of advertisements, be cautious of the information provided. Stay away from blogs and chat rooms please!      Nutrition & supplements:     Prenatal vitamin (those with 600-1000 mcg folic acid and 27 mg of iron are enough).  Take with food or Juice     4-5 servings of dairy or other calcium rich foods (fish, leafy greens, soy) per day - if not, take 500-1000 mg additional calcium (Tums, pills, chews). Take with dairy     Vitamin D3 4298-2582 IU geltab daily.  Take with fattiest meal.  Look for fortified foods also (Dairy, Juice)     2-3 (4) oz servings of fish, seafood, nuts (walnuts & almonds), oils, avocado per week - if not, take Omega 3 Fatty acids: DHA & MARIELLA 7877-7191 mg per day.  Other names: cod liver oil, fish oil. Take with fattiest meal.  Some prenatals " have DHA, but typically not a sufficient dose.    Fish: Do not eat shark, swordfish, lawrence mackerel, or tilefish when you are pregnant or breastfeeding.  They contain high levels of mercury.  Limit white (albacore) tuna to no more than 6 ounces per week. Http://www.fda.gov/downloads/ForConsumers/ConsumerUpdates/PAN347578.pdf         Touring the [facility]  To schedule a tour at either [place] or [facility], please do so online using the following links:  [facility] - [email or web address]&[email or web address]  [facility] - [email or web address]&[email or web address]&[email or web address]     You are invited to  Meet the [facility] Nurse-Midwives  A way to tour the hospital Labor and Delivery unit and meet the midwives that attend births since you may not have the opportunity to meet them during your prenatal care.  Some sessions are informal meet and greet type social hours, others address a specific concern or topic.    Tuesday, Februrary 12, 2019 7-8pm  [facility]    Wednesday, April 17, 2019 7-8pm  [facility], Auditorium A    Thursday, August 15, 2019 7-8pm  [facility]    Wednesday November 13, 2019 7-8 pm  [facility], Auditorum A    Please call [phone] to register

## 2021-05-30 NOTE — PROGRESS NOTES
Mar presents to clinic for Innatal blood draw. Was counseled at [facility] and after checking with insurance for coverage has decided to proceed with testing. Reviewed information on consent forms and signed.

## 2021-05-31 NOTE — PROGRESS NOTES
Mar Kaur is here for ERICK at 15w0d. Here alone. Insomnia concerns. Sleeps from about 9pm-2am but then wakes up and is wide awake. Discussed sleep hygiene and sleep aids prn. She will try Unisom or Benadryl. Discussed quickening. Reviewed options for mid pregnancy US with consideration of AMA. Considering Level II US if it can be done in [place] (she lives in [place]). Discussed MPP as an option and she would like to call to check with insurance first and then let us know. Discussed AFP option and she desires this- drawn today. RTC 4 weeks.

## 2021-05-31 NOTE — TELEPHONE ENCOUNTER
Name of caller: Patient  Phone number where you may be reached: [phone]  Reason for call: pls call with Inatal results, pt DOES want to know gender.  Best time to call back: any  If we don't reach you, is it okay to leave a detailed message? Yes, detailed is fine

## 2021-05-31 NOTE — PATIENT INSTRUCTIONS - HE
Visit [facility] Nurse [facility] - Contact information:  Appointment line and to get a hold of CNM in clinic Monday-Friday 8 am - 5 pm:  [phone].  There are some clinics with early start times (1st appointment 7:40 am) and others with evening hours (last appointment 6:20 pm).  Most are typically open from 8 am to 5 pm.    CNM on call answering service: [phone].  Specify your hospital of choice and leave a brief message for CNM;  will then page CNM who is on call at your specified hospital and you should receive a call back with 15 minutes.  Be sure that your ringer is audible and that you can accept blocked calls so that we can get back in touch with you! This number should be reserved for urgent needs if during the day, before 8 am, after 5 pm, weekends, holidays.    Contact the on-call CNM with warning signs, such as:    vaginal bleeding     Vaginal discharge and itching or pain and burning during urination    Leg/calf pain or swelling on one side    severe abdominal pain    nausea and vomiting more than 4-5 times a day, or if you are unable to keep anything down    fever more than 100.4 degrees F.         Touring the Maternity Care Center  To schedule a tour at either [place] or [facility], please do so online using the following links:  [facility] - [email or web address]&[email or web address]&ktu=342  [facility] - [email or web address]&[email or web address]&jof=357         You are invited to  Meet the [facility] Nurse-Midwives  A way to tour the hospital Labor and Delivery unit and meet the midwives that attend births since you may not have the opportunity to meet them during your prenatal care.  Some sessions are informal meet and greet type social hours, others address a specific concern or topic.    Tuesday, Februrary 12, 2019 [facility]    Wednesday, April 17, 2019 7-8pm  [facility], Auditorium A    Thursday, August 15, 2019 7-8pm  [facility]    Wednesday November 13, 2019 7-8 pm  [facility], Aud[facility]    Please call [phone] to register      Ultrasound Appointment:   Don't forget to schedule your ultrasound appointment around 20 weeks into your pregnancy. Your midwife will order the exam for you to schedule at [phone] with [facility] radiology locations or at the independent radiology clinic of your preference.      GENETIC SCREENING OPTIONS AT [facility]          All testing is optional. We don t recommend or discourage any test; it is totally up to you and your partner. Some couples wish to know their risk of having a baby with a genetic defect and others do not. We will support your decision. Abnormal results may lead to a discussion of options for further testing.    Accurate dating of your pregnancy is important for all testing so an ultrasound may be done prior to referral or testing.    No testing provides certainty; there are false positives and negatives associated with all testing, some more than others.    Most genetic testing is non-invasive (requires only a blood sample and sometimes an ultrasound or both).    It is always wise to check with your insurance carrier before proceeding.    Some testing can be done at our lab and some require a referral.    If you decide to do no testing, the 20 week ultrasound scan has some ability to detect abnormalities in the baby.    [place] or [facility]    At 10 wk or greater, a blood sample can be drawn here at clinic or at any of our referral offices. It will provide highly accurate results with low false positive rates for trisomy 18, trisomy 21 (Down Syndrome), and trisomy 13 (> 99% trisomy detection rate at a false positive rate of <0.1%). Gender identification can also be obtained if desired.    Quad Screen  (4-marker screen)    Between 15 and 21 weeks, a sample of blood can be drawn at our lab to assess your risk of having a baby with Down Syndrome, Trisomy 18 and neural tube defects. Such testing is able to detect these conditions in 80-90% of cases and the false-positive rate is approximately 5%.     Why is dental care in pregnancy important?  During pregnancy, you are more likely to have problems with your teeth or gums. If you have an infection in your teeth or gums, the chance of your baby being premature (born early) or having low birth weight may be slightly higher than if your teeth and gums are healthy  Dental care is safe during pregnancy and important for the health of you and your baby.   What should you know before you see the dentist?    Make sure your dentist knows that you are pregnant.    If medications for infection or for pain are needed, your dentist can prescribe ones that are safe for you and your baby.    Tell your dentist about any changes you have noticed since you became pregnant and about any medications r or supplements you are taking.    Routine x-rays should be avoided in pregnancy, but it may be necessary if there is a problem or an emergency.     Your body should be covered with a lead apron to protect you and your baby.    Dental work can be done safely at any point in pregnancy. If possible, it is best to delay treatments and pro- cedures until after the first trimester.    For more information on dental health in pregnancy: http://onlinelibrary.verde.com/store/10.1111/jmwh.22129/asset/cdco64818.pdf?v=1&t=gmkf2f90&r=[email or web address]     Quickening:   Your Baby in the Second Trimester of Pregnancy  ; At the start of the second trimester, you will feel your baby's movements, which get stronger as the baby grows bigger. At the end of the fourth month, your baby weighs about five ounces. Her kidneys begin to produce urine. During visits to your health care provider,  you will be able to hear your baby's heartbeat more clearly. Your baby can move and hear your voice.   ; By the end of the fifth month, you'll be able to feel light movements (called quickening) of your fetus. Your baby is sleeping and waking at regular intervals, and is more active than before. At this point, she is about nine inches long and weighs about one-half to one pound. During the sixth month, your baby's features become clearer. Eyebrows, eyelashes, and hair are developing. She also has finger and toe prints, and may be kicking strongly.  ; By the end of the second trimester, your baby weighs as much as two pounds and is about 11 inches long.         Gestational diabetes  Gestational diabetes develops during pregnancy (gestation). Like other types of diabetes, gestational diabetes affects how your cells use sugar (glucose). Gestational diabetes causes high blood sugar that can affect your pregnancy and your baby's health.  Any pregnancy complication is concerning, but there's good news. Expectant moms can help control gestational diabetes by eating healthy foods, exercising and, if necessary, taking medication. Controlling blood sugar can prevent a difficult birth and keep you and your baby healthy.  In gestational diabetes, blood sugar usually returns to normal soon after delivery. But if you've had gestational diabetes, you're at risk for type 2 diabetes. You'll continue working with your health care team to monitor and manage your blood sugar.    Who is at risk?  This is a list of factors that increase the risk of developing gestational diabetes for women during pregnancy:        Overweight prior to pregnancy (20% or more over ideal body weight)        High risk ethnic group: , , ,         Impaired glucose tolerance or traces of glucose in the urine        Family history of diabetes        Previously giving birth to a baby over 9 lbs. or stillborn         Previous pregnancy with gestational diabetes    Prevention:  There are no guarantees when it comes to preventing gestational diabetes -- but the more healthy habits you can adopt before pregnancy, the better. If you've had gestational diabetes, these healthy choices may also reduce your risk of having it in future pregnancies or developing type 2 diabetes down the road.    Eat healthy foods. Choose foods high in fiber and low in fat and calories. Focus on fruits, vegetables and whole grains. Strive for variety to help you achieve your goals without compromising taste or nutrition. Watch portion sizes.     Keep active. Exercising before and during pregnancy can help protect you from developing gestational diabetes. Aim for 30 minutes of moderate activity on most days of the week. Take a brisk daily walk. Ride your bike. Swim laps.  If you can't fit a single 30-minute workout into your day, several shorter sessions can do just as much good. Park in the distant lot when you run errands. Get off the bus one stop before you reach your destination. Every step you take increases your chances of staying healthy.    Lose excess pounds before pregnancy. Doctors don't recommend weight loss during pregnancy. But if you're planning to get pregnant, losing extra weight beforehand may help you have a healthier pregnancy.  Focus on permanent changes to your eating habits. Motivate yourself by remembering the long-term benefits of losing weight, such as a healthier heart, more energy and improved self-esteem.    Preventing Diabetes after Pregnancy:  It is estimated that 35-60 percent of women that have had gestational diabetes will develop type 2 diabetes in the future. It is also thought that children from these pregnancies have a greater chance of developing obesity and type 2 diabetes.    If you do have prediabetes or have risk factors for having diabetes, research shows that doing just two things can help you prevent or delay  type 2 diabetes: Lose 5% to 7% of your body weight, which would be 10 to 14 pounds for a 200-pound person; and get at least 150 minutes each week of physical activity, such as brisk walking.    RESOURCES   You can refer to the Starting Out Right book or find it online at http://www.[facility]altheast.org/images/stories/maternity/[facility]-Starting-Out-Right.pdf or http://www.healtheast.org/images/stories/flipbooks/[facility]-starting-out-right/[facility]-starting-out-right.html#p=8    You can sign up for a weekly parenting e-mail that gives support, tips and advice from health care professionals that starts with pregnancy and continues through the toddler years. To register, go to [email or web address].[facility].[email or web address]/baby at any time during your pregnancy.    Breastfeeding Information:  OUTPATIENT LACTATION RESOURCES    -Schedule a clinic appointment with a [facility] CNM with dedicated clinic hours for breastfeeding assistance by calling [phone]. Breastfeeding clinic visits are at [facility] on Wednesdays, [facility] on Tuesdays and [facility] on Thursdays.     -Schedule an outpatient appointment with one of the [facility] Lactation Consultants at [facility], [facility], or [facility] by calling [phone]    -Attend a baby weigh in at [facility].  Lactation consultants are available to answer questions  [place]: Tuesdays 1:00 - 2:00  [facility], [facility]: Mondays 1:00 - 2:00   www.[facility] CenterStartupxplore.Padinmotion    -Attend one of the [facility] groups at [facility] in [facility].  [facility] also offers one-on-one in home and in office lactation consults.   www.[facility].com    -Attend a LeLeche League meeting.  Multiple groups in several locations throughout the [facility]. The meetings are no-cost and always informative breastfeeding education session through [facility] La Leche Lecarlos  Www.llagueda.org/  Medication use while breastfeeding: http://toxnet.nlm.nih.gov/newtoxnet/lactmed.htm      Childbirth and Parenting Education:   [facility] parenting center: http://[facility] Al[facility]aTrueSpan.C9 Media/   (787) 484-BABY  [place]: (education, yoga & wellness) www.BearTail.C9 Media  Enlightened Mama: www.enlightenedmama.com   Childbirth collective: (Parent topic nights)  www.childbirthcollective.org/  Hypnobabies:  www.hypnobabiestChannel [employer].com/  Hypnobirthing:  Http://hypnobirthing.com/    Book Recommendations:   Rach Janet's Birthing From Within--first few chapters include a new-age tone, you may prefer to skip it and keep going, because there is good stuff later.  This book recommendation covers emotional preparation, but does cover coping with pain, and use of both pharmacological and nonpharmacological methods.    Dr. Nielsen' The Pregnancy Book and The Birth Book--the pregnancy book goes month-by month    Womanly Art of Breastfeeding by La Leche League International   Bestfeeding by Nicolette Smith--great pictures    Mothering Your Nursing Toddler, by Noemy Chavez.   Addresses dealing with so many of the challenging behaviors of a nursing toddler.  How Weaning Happens, by La Leche League.  Discusses weaning at all ages, from medically necessary weaning of an infant, all the way up to age 5 (or older), with why/why not, and strategies.  Very empowering book both for deciding to wean and deciding not to.    American College of Nurse-Midwives (ACNM) http://www.midwife.org/; look at the informational handouts at http://www.midwife.org/Share-With-Women     www.mymidwife.org    Mother to Baby (Medication and Herbal guidance in pregnancy): http://www.mothertobaby.org  Toll-Free Hotline: 407.914.6121  LactMed (Medication use while breastfeeding): http://toxnet.nlm.nih.gov/newtoxnet/lactmed.htm    Women's Health.gov:  http://www.womenshealth.gov/a-z-topics/index.html    American pregnancy association - http://americanpregnancy.org    Centering Pregnancy (group prenatal care option): http://centeringhealthcare.org    Information  "about doulas:  Childbirth collective: http://www.childbirthcollective.org/  Doulas of [place] ([place]):  www.norberto.org  [facility]  project: http://twincitiesdoulaproject.com/     Early Childhood and Family Education (ECFE):  ECFE offers parents hands-on learning experiences that will nourish a lifetime of teachable moments.  http://ecfe.info/ecfe-home/    March of Dimes www.Ahead     FDA - Nutrition  www.mypyramid.gov  Under \"For Consumers,\" click on \"pregnant and breastfeeding women.\"      Centers for Disease Control and Prevention (CDC) - Vaccines : http://www.cdc.gov/vaccines/       When researching information on the web, question the validity of websites.  The domains .gov, .edu and.org tend to be more reliable information.  If there are a lot of advertisements, be cautious of the information provided. Stay away from blogs and chat rooms please!  "

## 2021-06-01 NOTE — TELEPHONE ENCOUNTER
Name of caller: Alexandrea from [facility]  Phone number where you may be reached: [phone]  Reason for call: pls send NIPT and AFP results to [facility] at fax [phone]  Best time to call back: any  If we don't reach you, is it okay to leave a detailed message? yes

## 2021-06-01 NOTE — PROGRESS NOTES
Mar is here alone today.  She is feeling well.  Had level 2 at [facility] yesterday.  Reviewed results which were normal.  They are having a boy!  She brought in an exemption form for biometric screening.  This was completed for her today.  Discussed round ligament pain and comfort measures for this.  She is feeling active fetal movement daily.  Recommended that she begin noticing patterns of movement over the next 8 to 10 weeks so she can help define what is normal movement for her baby.

## 2021-06-01 NOTE — PATIENT INSTRUCTIONS - HE
[facility] Nurse Midwives - Contact information:  Appointment line and to get a hold of CNM in clinic Monday-Friday 8 am - 5 pm:  [phone].  There are some clinics with early start times (1st appointment 7:40 am) and others with evening hours (last appointment 6:20 pm).  Most are typically open from 8 am to 5 pm.    CNM on call answering service: [phone].  Specify your hospital of choice and leave a brief message for CNM;  will then page CNM who is on call at your specified hospital and you should receive a call back with 15 minutes.  Be sure that your ringer is audible and that you can accept blocked calls so that we can get back in touch with you! This number should be reserved for urgent needs if during the day, before 8 am, after 5 pm, weekends, holidays.    Contact the on-call CNM with warning signs, such as:    vaginal bleeding     Vaginal discharge and itching or pain and burning during urination    Leg/calf pain or swelling on one side    severe abdominal pain    nausea and vomiting more than 4-5 times a day, or if you are unable to keep anything down    fever more than 100.4 degrees F.          You are invited to  Meet the [facility] Nurse-Midwives  A way to tour the hospital Labor and Delivery unit and meet the midwives that attend births since you may not have the opportunity to meet them during your prenatal care.  Some sessions are informal meet and greet type social hours, others address a specific concern or topic.    2019 7-8pm  [facility]    2019 7-8pm  [facility], Auditorium A    Thursday, August 15, 2019 7-8pm  [facility]    2019 7-8 pm  [facility], Auditorum A    Please call [phone] to register      UNDERSTANDING  LABOR    Going into labor before your 37th week of pregnancy is called  labor.   labor can cause your baby to be born too soon. This can lead to a number of health problems that may affect your baby. From 28-35 weeks, Patients are advised to be evaluated at [facility] since they have a  Intensive Care Unit (NICU).  -Before labor, the cervix is thick and closed.  -In  labor, the cervix begins to efface (thin) and dilate (open) over a short period of time    Symptoms of  Labor  If you believe you re having  labor, contact the midwives right away. But contractions alone don t mean you re in  labor. What matters more are changes in your cervix (the lower end of the uterus). Symptoms of  labor include:    five or more contractions per hour    Strong & frequent contractions    Constant menstrual-like cramping    Low-back pain    Mucous or bloody vaginal discharge    Bleeding or spotting in the second or third trimester    Evaluating  Labor  Your midwife will try to find out whether you re in  labor or whether you re just having contractions.She may watch you for a few hours. The following tests may be done:    Pelvic exam to see if your cervix has effaced (thinned) and dilated (opened)    Uterine activity monitoring to detect contractions    Fetal monitoring to check the health of your baby    Ultrasound to check your baby s size and position    Caring for Yourself At Home  If you have contractions  but your cervix is still thick and closed, the midwife may ask you to do the following at home:    Drink plenty of water.    Do fewer activities.    Rest in bed on your side.    Avoid intercourse and nipple stimulation.    When to Call Your Midwife    Five or more contractions per hour    Bag of water breaks    Bleeding or spotting     If You Need Hospital Care   labor often requires that you have hospital care and complete bed rest. You may have an IV (intravenous) line to get fluids. And you may be given pills or  an injection to help prevent contractions. Finally, you may receive medication (corticosteroids) that helps your baby s lungs mature more quickly.    Are You At Risk?  Any pregnant woman can have  labor. It may start for no reason. But these risk factors can increase your chances:    Past  labor or past early birth    Smoking and drug or alcohol use during pregnancy    Multiple fetuses (twins or more)    Problems with the shape of the uterus    Bleeding during the pregnancy    The Dangers of  Birth  A baby born too soon may have health problems. This is because the baby didn t have enough time to mature. The baby then is at risk of:    Not breastfeeding well    Having immature lungs    Bleeding in the brain    Dying    Reaching Term  Your goal is to get as close to term as you can before giving birth. The closer you get to term, the higher your chance of having a healthy baby. Work with your healthcare provider. Together, you can take steps that may keep you from giving birth too early.        Testing for Gestational Diabetes in Pregnancy  [facility] are committed to providing safe care during your pregnancy. We follow the recommendations of the American Diabetes Association and the American College of Obstetricians and Gynecologists to test all pregnant women for gestational diabetes. Testing early in pregnancy (if you have risk factors) and testing all women between 26-28 weeks follows local and national guidelines for care during pregnancy. Clients who feel that they cannot consent to such testing, may choose to transfer their care to our consultant obstetricians.  What is the test?  Eat normally on the day of the test; a diet rich in protein, whole grains, and vegetables would be best. Avoid simple sugars, white flour products and juices prior to testing.  You will be asked to drink a 10 oz glucose beverage (50 gm, about the same as a can of root beer).  The product is not  carbonated as it has to be consumed within 5 minutes. During the next hour, you are seen for a prenatal visit, but are asked to limit your activity around the clinic. Feel free to bring a book or your computer.   Any level less than 140 for this  glucose challenge test  is considered normal. If measured at 140 to 185, the diagnostic test, a  3 hour glucose tolerance test  will be conducted. If 2 or more readings are abnormal, the diagnosis of gestational diabetes is confirmed and a referral to a diabetes educator will be made. If the level is 185 or above, this confirms the diagnosis and a referral will be made without administering the 3 hour test.  A fasting blood sugar may be performed sometime in the first trimester if you have risk factors for the development of gestational diabetes such as a previous diagnosis or birth of a large baby or a close family relative with diabetes.  What is gestational diabetes?  Your body converts what you eat into glucose. In response to rising blood sugar levels it secretes insulin in order to be able to utilize that glucose. During pregnancy as the placenta grows and matures, it secretes hormones that are necessary to assure baby s growth and development, however, they also reduce the action of insulin in the mother. In some cases too much insulin is blocked (this is called  insulin resistance ) and blood sugar in the mother rises above a normal level. Pregnant women who have never had diabetes before but who have high blood sugar (glucose) levels during pregnancy are said to have gestational diabetes. Gestational diabetes affects about 4-7% of all pregnancies.  What if I have gestational diabetes?  If either of the tests for gestational diabetes show that you have this condition, a referral will be made to visit with the diabetes educator. The educator will help you to make wise food choices, count carbohydrates, monitor your blood sugar and record your levels in a journal. We  ask that you bring this diary with you at each prenatal visit. You may meet with the educator several times during the remainder of your pregnancy.  How gestational diabetes can affect your baby  Some mothers may wonder why testing for GDM is delayed until the early part of the 3rd trimester for most women. Gestational diabetes has an impact on the baby at the time of rapid body growth. When the mother s blood has elevated sugar levels, extra glucose crosses the placenta causing the baby to have excess weight gain ( macrosomia ). Some babies are too big to be born vaginally so their mother must have  births. Babies of mothers with uncontrolled gestational diabetes may have difficult births that can cause trauma to the mother and in rare circumstances, babies may suffer fractures or oxygen deprivation during birth. They may have difficulty maintaining their own blood sugar after birth and they may also have trouble adapting to life outside. Recent research indicates that babies of mothers with uncontrolled or undiagnosed gestational diabetes are at risk for obesity and type 2 diabetes. Women who develop gestational diabetes are more likely to develop type 2 diabetes within 15 years after their pregnancy.  Additional Information  The American College of Nurse Midwives (ACNM) provides an information sheet describing diabetes screening in pregnancy: http://www.womensdocs.com/milton/pdf/Second_Trimester/Gestational_Diabetes.pdf    You can visit the American Diabetes Association website http://www.diabetes.org for additional information and to purchase their book,  Gestational Diabetes: What to Expect .    A brochure from the American College of Obstetrics and Gynecology is available at:   http://www.acog.org/~/media/For%20Patients/wkg374.pdf?dmc=1&eh=[email or web address]  Testing for gestational diabetes is a critical part of your prenatal journey. Thank you for taking good care of yourself and your baby.    HEALTHY  PREGNANCY CARE: 22-26 WEEKS PREGNANT    You are finishing your second trimester. Your baby is developing rapidly. At this stage, babies have a sense of balance, can respond to touch, and are recognizing parent voices.  Your baby will be moving around more now.  You may notice Kemmerer-Thompson contractions now, which are painless and prepare the uterus for the delivery.    Nutrition: During this time, you may find that your nausea and fatigue are gone. Overall, you may feel better and have more energy than you did in your first trimester. Be sure you are getting enough calcium and iron in your diet. Your prenatal vitamins cannot supply all of the nutrients you need, so continue to eat 3-4 servings of dairy foods and 2-3 servings of meat/fish/poultry/nuts every day. Foods high in iron include: red meats, eggs, dark green vegetables, dark yellow vegetables, nuts, kidney beans and chickpeas. Some cereals are fortified with iron, so look at the food labels for 100% of the daily requirement for iron.     Discuss your work situation with your midwife or physician as needed. If you stand for long periods of time, you may need to make changes and take breaks.    [place] for childbirth and parenting classes, including an infant CPR class. Breastfeeding classes are recommended too.    Childbirth and Parenting Education:   [place] parenting center: http://[facility]ive/   (733) 193-BABY  [place]: (education, yoga & wellness) www.Panl  Enlightened Mama: www.Capsilon CorporationenedVeacon.PropelAd.com   Childbirth collective: (Parent topic nights)  www.childbirthcollective.org/  Hypnobabies:  www.hypnobabiestwincities.com/  Hypnobirthing:  Http://hypnobirthing.com/    Book Recommendations:   Rach Janet's Birthing From Within--first few chapters include a new-age tone, you may prefer to skip it and keep going, because there is good stuff later.  This book recommendation covers emotional preparation, but does cover coping with pain, and use of  "both pharmacological and nonpharmacological methods.    Dr. Nielsen' The Pregnancy Book and The Birth Book--the pregnancy book goes month-by month    Womanly Art of Breastfeeding by La Leche League International   Bestfeeding by Nicolette Smith--great pictures    Mothering Your Nursing Toddler, by Noemy Chavez.   Addresses dealing with so many of the challenging behaviors of a nursing toddler.  How Weaning Happens, by La Leche League.  Discusses weaning at all ages, from medically necessary weaning of an infant, all the way up to age 5 (or older), with why/why not, and strategies.  Very empowering book both for deciding to wean and deciding not to.    American College of Nurse-Midwives (ACNM) http://www.midwife.org/; look at the informational handouts at http://www.midwife.org/Share-With-Women     www.mymidwife.org    Mother to Baby (Medication and Herbal guidance in pregnancy): http://www.mothertobaby.org  Toll-Free Hotline: 936.931.7554  LactMed (Medication use while breastfeeding): http://toxnet.nlm.nih.gov/newtoxnet/lactmed.htm    Women's Health.gov:  http://www.womenshealth.gov/a-z-topics/index.html    American pregnancy association - http://americanpregnancy.org    Centering Pregnancy (group prenatal care option): http://centeringhealthcare.org    Information about doulas:  Childbirth collective: http://www.childbirthcollective.org/  Doulas of [place] ([place]):  www.norberto.org  [facility]  project: http://twincitiesdoulaproject.com/     Early Childhood and Family Education (ECFE):  ECFE offers parents hands-on learning experiences that will nourish a lifetime of teachable moments.  http://ecfe.info/ecfe-home/    March of Dimes www.PPDai.Favor     FDA - Nutrition  www.mypyramid.gov  Under \"For Consumers,\" click on \"pregnant and breastfeeding women.\"      Centers for Disease Control and Prevention (CDC) - Vaccines : http://www.cdc.gov/vaccines/       When researching information on the web, " question the validity of websites.  The Veenome .gov, .edu and.org tend to be more reliable information.  If there are a lot of advertisements, be cautious of the information provided. Stay away from blogs and chat rooms please!    How can you care for yourself at home?   You can refer to the Starting Out Right book or find it online at http://www.healtheast.org/images/stories/maternity/[facility]-Starting-Out-Right.pdf or http://www.healtheast.org/images/stories/flipbooks/[facility]-starting-out-right/[facility]-starting-out-right.html#p=8     You can sign up for a weekly parenting e-mail that gives support, tips and advice from health care professionals that starts with pregnancy and continues through the toddler years. To register, go to www.[employer].org/baby at any time during your pregnancy.      Baby Feeding in the Hospital: Information, Support and Resources    As you prepare for the birth of your child, you will want to consider options for feeding your baby including breast-feeding and/or baby formula. The American Academy of Pediatrics recommends exclusive breast-feeding for the first six months (although any amount of breast-feeding is beneficial).  However, we also understand that breast-feeding is a personal choice and not for everyone. Whether or not you choose to breast-feed, your decision will be respected by our staff.    There are numerous benefits of breast-feeding; here are a few to consider:    Provides antibodies to protect your baby from infections and diseases    The cost: formula can cost over $1,500 per year    Convenience, no warming up or sterilizing bottles and supplies    The physical contact with breastfeeding can make babies feel secure, warm and comforted    What ever my feeding choice, what can I expect after I deliver my baby?    Your baby will usually be placed skin-to-skin immediately following birth. The skin to skin contact between you and your baby will be a special and  memorable time. The bonding and attachment comforts your baby and has a positive effect on baby s brain development.     Having your baby  room in  with you also helps you start to learn your baby s body rhythms and sleep cycle.      You will also begin to learn your baby s cues (signals) that he or she is ready to feed.    When do I start to feed my baby?  As soon as possible after your baby s birth, you will be encouraged to begin feeding.  In the first couple of weeks, your baby will eat often.  Breastfeeding babies usually eat at least 8 times in 24 hours.  Babies fed formula usually eat at least 7 times in 24 hours.      Breast-feeding tips:    Get comfortable and use pillows for support.    Have your baby at the level of your breast, facing you,  tummy to tummy .      Touch your nipple to your baby s lips so you baby s mouth opens wide (rooting reflex).  Aim the nipple toward the roof of your baby s mouth. When your baby opens his or her mouth, pull your baby toward your breast to help your baby  latch on  to your nipple and much of the areola area.    Hand expressing your breast milk can assist with latching your baby to your breast, if needed.    Ask for help, breastfeeding may seem awkward or uncomfortable at first, this is normal. There are numerous resources available at [facility] and beyond.     If your goal is to exclusively breastfeed, avoid using any formula or artificial nipples (including bottles and pacifiers) while you are your baby are learning to breastfeed unless there is a medical reason.       Mixing breastfeeding and formula can interfere with how you begin building your milk supply.  It can impact how you and your baby  learn  to breastfeeding together and alter the natural growth of  good  bacteria in your baby s stomach.    Delay a pacifier or a bottle in the first few weeks until breastfeeding is well established. This is often around 3 weeks of age.    Ask your nurse  to show you how to hand express.   Breast milk can be kept in the refrigerator or freezer for later use.        Touring the Maternity Care Center  To schedule a tour at either [place] or [facility], please do so online using the following links:  [facility] - [email or web address] [email or web address]&[email or web address]&[email or web address]&[email or web address]&[email or web address]&[email or web address]&[email or web address]&[email or web address]&ysd=123  [facility] - [email or web address] [email or web address]&[email or web address]&[email or web address]&[email or web address]&[email or web address]&[email or web address]&[email or web address]&[email or web address]&[email or web address]    Hospital and Clinic  Resources:  -Schedule an appointment with a [facility] CNM who is also a Lactation Consultant by calling [phone]     -Schedule a clinic appointment with a [facility] CNM with dedicated clinic hours for breastfeeding assistance by calling [phone]. Breastfeeding clinic visits are at [facility] on Wednesdays, [facility] on Tuesdays and [facility] on Thursdays.     [facility] Lactation Clinics located at [facility], [facility] and [facility]  Call: [phone].    Inpatient support    Outpatient appointments    Telephone consultation    Breast-feeding classes available through [employer]      -Attend a baby weigh in at [facility].  Lactation consultants are available to answer questions  [place]: Tuesdays 1:00 - 2:00  [facility]: Mondays 1:00 - 2:00  www.Synchro    -Attend one of the New Mama groups at [facility] in [facility]  [facility] also offers one-on-one in home and in office lactation consults.   www.[facility]    -Attend a LeLeche League meeting.  Multiple groups in several locations throughout the [facility]. The meetings are no-cost and always informative breastfeeding education session through [employer] La Leche League  Www.lllofmndas.org/  Medication use while breastfeeding: http://toxnet.nlm.nih.gov/newtoxnet/lactmed.htm     Online  Resources:    [facility].org/baby sign up for free online weekly e-mail    [facility]theast.org/maternity    Breastfeedingmadesimple.com    [employer]inity Networksli.LANDBAY (La Leche League)    Normalfed.com    Womenshealth.gov/breastfeeding    Workandpump.com    Breast-feeding Supplies & Pumps:  Talk to your insurance provider or WIC (Women, Infants and Children) to learn more about options available to you. Recent health insurance changes may include additional coverage for supplies and pumps.    Public Health:  Women, Infants and Children Nutrition program (WIC): provides breast-feeding support and education in addition to formal feeding moms. 097-CSA-3347 or http://www.health.[facility].mn.us/divs/fh/wic    Family Health Home Visiting: [facility] Nurse home visits are available. Talk to your provider to see if you qualify. Most [facility] have a program available.    Additional Resources:  La Leche League is an international, nonprofit, nonsectarian organization offering information, education, and support to mothers who want to breast-feed their babies. Local groups offer phone help and monthly meetings. Visit [employer].LANDBAY or [employer]LANDBAY and us the  Find local support  drop down menu or click on the  Resources  tab.    [place] Breastfeeding Resources: 8-894-498-BABY (4652) toll free    National Breastfeeding Help Line trained breastfeeding peer counselors can help answer common breast-feeding questions by phone. Monday-Friday: English/Fijian  [phone]- [phone] toll free, 1-503.193.9091 (TTY)    [facility] Connection: 646-206-[facility] (5615)

## 2021-06-02 NOTE — PROGRESS NOTES
Mar is here alone today.  Feeling well.  No new questions today.  Does not plan to take prenatal classes during this pregnancy.  Appropriate weight gain.  Feeling daily movement.  Discussed noticing patterns of movement over the next 6 weeks or so.  Discussed s/s PTL.  Will plan GDM, Hgb, RPR, antibody testing next visit as well as Rhogam and offer Tdap.

## 2021-06-02 NOTE — PATIENT INSTRUCTIONS - HE
[facility] Nurse Midwives - Contact information:  Appointment line and to get a hold of CNM in clinic Monday-Friday 8 am - 5 pm:  [phone].  There are some clinics with early start times (1st appointment 7:40 am) and others with evening hours (last appointment 6:20 pm).  Most are typically open from 8 am to 5 pm.    CNM on call answering service: [phone].  Specify your hospital of choice and leave a brief message for CNM;  will then page CNM who is on call at your specified hospital and you should receive a call back with 15 minutes.  Be sure that your ringer is audible and that you can accept blocked calls so that we can get back in touch with you! This number should be reserved for urgent needs if during the day, before 8 am, after 5 pm, weekends, holidays.    Contact the on-call CNM with warning signs, such as:    vaginal bleeding     Vaginal discharge and itching or pain and burning during urination    Leg/calf pain or swelling on one side    severe abdominal pain    nausea and vomiting more than 4-5 times a day, or if you are unable to keep anything down    fever more than 100.4 degrees F.          You are invited to  Meet the [facility] Nurse-Midwives  A way to tour the hospital Labor and Delivery unit and meet the midwives that attend births since you may not have the opportunity to meet them during your prenatal care.  Some sessions are informal meet and greet type social hours, others address a specific concern or topic.    2019 7-8pm  [facility]    2019 7-8pm  [facility], Auditorium [facility]    Thursday, August 15, 2019 7-8pm  [facility]    2019 7-8 pm  [facility], [facility]    Please call [phone] to register      UNDERSTANDING  LABOR    Going into labor before your 37th week of pregnancy is called  labor.   labor can cause your baby to be born too soon. This can lead to a number of health problems that may affect your baby. From 28-35 weeks, Patients are advised to be evaluated at [facility] since they have a  Intensive Care Unit (NICU).  -Before labor, the cervix is thick and closed.  -In  labor, the cervix begins to efface (thin) and dilate (open) over a short period of time    Symptoms of  Labor  If you believe you re having  labor, contact the midwives right away. But contractions alone don t mean you re in  labor. What matters more are changes in your cervix (the lower end of the uterus). Symptoms of  labor include:    five or more contractions per hour    Strong & frequent contractions    Constant menstrual-like cramping    Low-back pain    Mucous or bloody vaginal discharge    Bleeding or spotting in the second or third trimester    Evaluating  Labor  Your midwife will try to find out whether you re in  labor or whether you re just having contractions.She may watch you for a few hours. The following tests may be done:    Pelvic exam to see if your cervix has effaced (thinned) and dilated (opened)    Uterine activity monitoring to detect contractions    Fetal monitoring to check the health of your baby    Ultrasound to check your baby s size and position    Caring for Yourself At Home  If you have contractions  but your cervix is still thick and closed, the midwife may ask you to do the following at home:    Drink plenty of water.    Do fewer activities.    Rest in bed on your side.    Avoid intercourse and nipple stimulation.    When to Call Your Midwife    Five or more contractions per hour    Bag of water breaks    Bleeding or spotting     If You Need Hospital Care   labor often requires that you have hospital care and complete bed rest. You may have an IV (intravenous) line to get fluids. And you may be given pills or  an injection to help prevent contractions. Finally, you may receive medication (corticosteroids) that helps your baby s lungs mature more quickly.    Are You At Risk?  Any pregnant woman can have  labor. It may start for no reason. But these risk factors can increase your chances:    Past  labor or past early birth    Smoking and drug or alcohol use during pregnancy    Multiple fetuses (twins or more)    Problems with the shape of the uterus    Bleeding during the pregnancy    The Dangers of  Birth  A baby born too soon may have health problems. This is because the baby didn t have enough time to mature. The baby then is at risk of:    Not breastfeeding well    Having immature lungs    Bleeding in the brain    Dying    Reaching Term  Your goal is to get as close to term as you can before giving birth. The closer you get to term, the higher your chance of having a healthy baby. Work with your healthcare provider. Together, you can take steps that may keep you from giving birth too early.        Testing for Gestational Diabetes in Pregnancy  [facility] are committed to providing safe care during your pregnancy. We follow the recommendations of the American Diabetes Association and the American College of Obstetricians and Gynecologists to test all pregnant women for gestational diabetes. Testing early in pregnancy (if you have risk factors) and testing all women between 26-28 weeks follows local and national guidelines for care during pregnancy. Clients who feel that they cannot consent to such testing, may choose to transfer their care to our consultant obstetricians.  What is the test?  Eat normally on the day of the test; a diet rich in protein, whole grains, and vegetables would be best. Avoid simple sugars, white flour products and juices prior to testing.  You will be asked to drink a 10 oz glucose beverage (50 gm, about the same as a can of root beer).  The product is not  carbonated as it has to be consumed within 5 minutes. During the next hour, you are seen for a prenatal visit, but are asked to limit your activity around the clinic. Feel free to bring a book or your computer.   Any level less than 140 for this  glucose challenge test  is considered normal. If measured at 140 to 185, the diagnostic test, a  3 hour glucose tolerance test  will be conducted. If 2 or more readings are abnormal, the diagnosis of gestational diabetes is confirmed and a referral to a diabetes educator will be made. If the level is 185 or above, this confirms the diagnosis and a referral will be made without administering the 3 hour test.  A fasting blood sugar may be performed sometime in the first trimester if you have risk factors for the development of gestational diabetes such as a previous diagnosis or birth of a large baby or a close family relative with diabetes.  What is gestational diabetes?  Your body converts what you eat into glucose. In response to rising blood sugar levels it secretes insulin in order to be able to utilize that glucose. During pregnancy as the placenta grows and matures, it secretes hormones that are necessary to assure baby s growth and development, however, they also reduce the action of insulin in the mother. In some cases too much insulin is blocked (this is called  insulin resistance ) and blood sugar in the mother rises above a normal level. Pregnant women who have never had diabetes before but who have high blood sugar (glucose) levels during pregnancy are said to have gestational diabetes. Gestational diabetes affects about 4-7% of all pregnancies.  What if I have gestational diabetes?  If either of the tests for gestational diabetes show that you have this condition, a referral will be made to visit with the diabetes educator. The educator will help you to make wise food choices, count carbohydrates, monitor your blood sugar and record your levels in a journal. We  ask that you bring this diary with you at each prenatal visit. You may meet with the educator several times during the remainder of your pregnancy.  How gestational diabetes can affect your baby  Some mothers may wonder why testing for GDM is delayed until the early part of the 3rd trimester for most women. Gestational diabetes has an impact on the baby at the time of rapid body growth. When the mother s blood has elevated sugar levels, extra glucose crosses the placenta causing the baby to have excess weight gain ( macrosomia ). Some babies are too big to be born vaginally so their mother must have  births. Babies of mothers with uncontrolled gestational diabetes may have difficult births that can cause trauma to the mother and in rare circumstances, babies may suffer fractures or oxygen deprivation during birth. They may have difficulty maintaining their own blood sugar after birth and they may also have trouble adapting to life outside. Recent research indicates that babies of mothers with uncontrolled or undiagnosed gestational diabetes are at risk for obesity and type 2 diabetes. Women who develop gestational diabetes are more likely to develop type 2 diabetes within 15 years after their pregnancy.  Additional Information  The American College of Nurse Midwives (ACNM) provides an information sheet describing diabetes screening in pregnancy: http://www.womensdocs.com/milton/pdf/Second_Trimester/Gestational_Diabetes.pdf    You can visit the American Diabetes Association website http://www.diabetes.org for additional information and to purchase their book,  Gestational Diabetes: What to Expect .    A brochure from the American College of Obstetrics and Gynecology is available at:   http://www.acog.org/~/media/For%20Patients/ofh498.pdf?dmc=1&qy=[id number]  Testing for gestational diabetes is a critical part of your prenatal journey. Thank you for taking good care of yourself and your baby.    HEALTHY  PREGNANCY CARE: 22-26 WEEKS PREGNANT    You are finishing your second trimester. Your baby is developing rapidly. At this stage, babies have a sense of balance, can respond to touch, and are recognizing parent voices.  Your baby will be moving around more now.  You may notice Chester-Thompson contractions now, which are painless and prepare the uterus for the delivery.    Nutrition: During this time, you may find that your nausea and fatigue are gone. Overall, you may feel better and have more energy than you did in your first trimester. Be sure you are getting enough calcium and iron in your diet. Your prenatal vitamins cannot supply all of the nutrients you need, so continue to eat 3-4 servings of dairy foods and 2-3 servings of meat/fish/poultry/nuts every day. Foods high in iron include: red meats, eggs, dark green vegetables, dark yellow vegetables, nuts, kidney beans and chickpeas. Some cereals are fortified with iron, so look at the food labels for 100% of the daily requirement for iron.     Discuss your work situation with your midwife or physician as needed. If you stand for long periods of time, you may need to make changes and take breaks.    [place] for childbirth and parenting classes, including an infant CPR class. Breastfeeding classes are recommended too.    Childbirth and Parenting Education:   [place] parenting center: http://[facility] Bio[facility]aling[facility] CenterGRAVIDI/   (690) 991-BABY  [place]: (education, yoga & wellness) www.[employer]  Enlightened Mama: www.GetyooenedKonaWare.[employer]ave Lab   Childbirth collective: (Parent topic nights)  www.childbirthcollective.org/  Hypnobabies:  www.hypnobabiestwincities.com/  Hypnobirthing:  Http://hypnobirthing.com/    Book Recommendations:   Rach Janet's Birthing From Within--first few chapters include a new-age tone, you may prefer to skip it and keep going, because there is good stuff later.  This book recommendation covers emotional preparation, but does cover coping with pain, and use of  "both pharmacological and nonpharmacological methods.    Dr. Nielsen' The Pregnancy Book and The Birth Book--the pregnancy book goes month-by month    Womanly Art of Breastfeeding by La Leche League International   Bestfeeding by Nicolette Smith--great pictures    Mothering Your Nursing Toddler, by Noemy Chavez.   Addresses dealing with so many of the challenging behaviors of a nursing toddler.  How Weaning Happens, by La Leche League.  Discusses weaning at all ages, from medically necessary weaning of an infant, all the way up to age 5 (or older), with why/why not, and strategies.  Very empowering book both for deciding to wean and deciding not to.    American College of Nurse-Midwives (ACNM) http://www.midwife.org/; look at the informational handouts at http://www.midwife.org/Share-With-Women     www.mymidwife.org    Mother to Baby (Medication and Herbal guidance in pregnancy): http://www.mothertobaby.org  Toll-Free Hotline: 763.959.9110  LactMed (Medication use while breastfeeding): http://toxnet.nlm.nih.gov/newtoxnet/lactmed.htm    Women's Health.gov:  http://www.womenshealth.gov/a-z-topics/index.html    American pregnancy association - http://americanpregnancy.org    Centering Pregnancy (group prenatal care option): http://centeringhealthcare.org    Information about doulas:  Childbirth collective: http://www.childbirthcollective.org/  Doulas of [place] ([place]):  www.norberto.org  [facility]  project: http://twincitiesdoulaproject.com/     Early Childhood and Family Education (ECFE):  ECFE offers parents hands-on learning experiences that will nourish a lifetime of teachable moments.  http://ecfe.info/ecfe-home/    March of [place]imes www.Unidym.[employer]     FDA - Nutrition  www.mypyramid.gov  Under \"For Consumers,\" click on \"pregnant and breastfeeding women.\"      Centers for Disease Control and Prevention (CDC) - Vaccines : http://www.cdc.gov/vaccines/       When researching information on the web, " question the validity of websites.  The [employer] .gov, .edu and.org tend to be more reliable information.  If there are a lot of advertisements, be cautious of the information provided. Stay away from blogs and chat rooms please!    How can you care for yourself at home?   You can refer to the Starting Out Right book or find it online at http://www.[facility]althe[facility].org/images/stories/maternity/[facility]-Starting-Out-Right.pdf or http://www.healtheast.org/images/stories/flipbooks/[facility]-starting-out-right/[facility]-starting-out-right.html#p=8     You can sign up for a weekly parenting e-mail that gives support, tips and advice from health care professionals that starts with pregnancy and continues through the toddler years. To register, go to www.Rezdy.org/baby at any time during your pregnancy.      Baby Feeding in the Hospital: Information, Support and Resources    As you prepare for the birth of your child, you will want to consider options for feeding your baby including breast-feeding and/or baby formula. The American Academy of Pediatrics recommends exclusive breast-feeding for the first six months (although any amount of breast-feeding is beneficial).  However, we also understand that breast-feeding is a personal choice and not for everyone. Whether or not you choose to breast-feed, your decision will be respected by our staff.    There are numerous benefits of breast-feeding; here are a few to consider:    Provides antibodies to protect your baby from infections and diseases    The cost: formula can cost over $1,500 per year    Convenience, no warming up or sterilizing bottles and supplies    The physical contact with breastfeeding can make babies feel secure, warm and comforted    What ever my feeding choice, what can I expect after I deliver my baby?    Your baby will usually be placed skin-to-skin immediately following birth. The skin to skin contact between you and your baby will be a special and  memorable time. The bonding and attachment comforts your baby and has a positive effect on baby s brain development.     Having your baby  room in  with you also helps you start to learn your baby s body rhythms and sleep cycle.      You will also begin to learn your baby s cues (signals) that he or she is ready to feed.    When do I start to feed my baby?  As soon as possible after your baby s birth, you will be encouraged to begin feeding.  In the first couple of weeks, your baby will eat often.  Breastfeeding babies usually eat at least 8 times in 24 hours.  Babies fed formula usually eat at least 7 times in 24 hours.      Breast-feeding tips:    Get comfortable and use pillows for support.    Have your baby at the level of your breast, facing you,  tummy to tummy .      Touch your nipple to your baby s lips so you baby s mouth opens wide (rooting reflex).  Aim the nipple toward the roof of your baby s mouth. When your baby opens his or her mouth, pull your baby toward your breast to help your baby  latch on  to your nipple and much of the areola area.    Hand expressing your breast milk can assist with latching your baby to your breast, if needed.    Ask for help, breastfeeding may seem awkward or uncomfortable at first, this is normal. There are numerous resources available at [facility] and beyond.     If your goal is to exclusively breastfeed, avoid using any formula or artificial nipples (including bottles and pacifiers) while you are your baby are learning to breastfeed unless there is a medical reason.       Mixing breastfeeding and formula can interfere with how you begin building your milk supply.  It can impact how you and your baby  learn  to breastfeeding together and alter the natural growth of  good  bacteria in your baby s stomach.    Delay a pacifier or a bottle in the first few weeks until breastfeeding is well established. This is often around 3 weeks of age.    Ask your nurse  to show you how to hand express.   Breast milk can be kept in the refrigerator or freezer for later use.        Touring the Maternity Care Center  To schedule a tour at either [place] or [facility], please do so online using the following links:  [facility] - [email or web address]/[email or web address]?s=[email or web address]&[email or web address]&[email or web address]&[email or web address]&[email or web address]&[email or web address]&[email or web address]&it=1&vph=476  [facility] - https://www.[employer]vision Media/registerlist.asp?s=[email or web address]&[email or web address]&[email or web address]&[email or web address]&[email or web address]&[email or web address]&[email or web address]&it=1&uwl=802    Hospital and Clinic  Resources:  -Schedule an appointment with a [facility] CNM who is also a Lactation Consultant by calling [phone]     -Schedule a clinic appointment with a [facility] CNM with dedicated clinic hours for breastfeeding assistance by calling [phone]. Breastfeeding clinic visits are at [facility] on Mondays, [facility] on Tuesdays and [facility] on Thursdays.     Baby Café    Pregnant and interested in breastfeeding?  Need answers to breastfeeding questions?  Want to help breastfeeding moms?  Already breastfeeding and want to meet other moms?    Join us at the Baby Café!    Baby Cafe is a free, drop-in service offering breast-feeding support for pregnant women, breast-feeding mothers and their families.  Come share tips and socialize with other mothers.  Babies and siblings are welcome (no childcare available).    Starting April 2018, Baby Café will be at 4 locations.  Please see below for the Baby Café closest to you!      [facility]  [address] [place] [address]  1st Wednesday: 10am-12pm    [facility]  [address] [place] [address]  3rd Wednesday 4-6pm    [facility]  [address] [place] [address]  4th Wednesday 10am-12:30pm    [facility]  [address] [place] [address]  4th Wednesdays: 4-6pm      Hmong, Sinhala, and Egyptian which is may be available at some sites.    For more information, please  contact: Marlen De Leon@[facility] or [phone]    -Attend a baby weigh in at [facility].  Lactation consultants are available to answer questions  [place]: Tuesdays 1:00 - 2:00  [facility], [facility]: Mondays 1:00 - 2:00  www.[facility] CenterEarlyShares.COZero    -Attend one of the New Mama groups at [facility] in [facility].  [facility] also offers one-on-one in home and in office lactation consults.   www.[facility].COZero    -Attend a Mehdi Castañeda meeting.  Multiple groups in several locations throughout the [facility]. The meetings are no-cost and always informative breastfeeding education session through [employer] La Leche League  Www.federico.org/  Medication use while breastfeeding: http://toxnet.nlm.nih.gov/newtoxnet/lactmed.htm     Online Resources:    [facility].org/baby sign up for free online weekly e-mail    [facility].org/maternity    Breastfeedingmadesimple.com    WIB.Playdate App (La Leche League)    Normalfed.com    Womenshealth.gov/breastfeeding    Workandpump.com    Breast-feeding Supplies & Pumps:  Talk to your insurance provider or WIC (Women, Infants and Children) to learn more about options available to you. Recent health insurance changes may include additional coverage for supplies and pumps.    Public Health:  Women, Infants and Children Nutrition program (WIC): provides breast-feeding support and education in addition to formal feeding moms. 449-GXG-9974 or http://www.health.[facility].us/divs/fh/wic    Family Health Home Visiting: Public [facility] Nurse home visits are available. Talk to your provider to see if you qualify. Most counties have a program available.    Additional Resources:  La Leche League is an international, nonprofit, nonsectarian organization offering information, education, and support to mothers who want to breast-feed their babies. Local groups offer phone help and monthly meetings. Visit [employer].org or The Sandpit.org and us the  Find local  support  drop down menu or click on the  Resources  tab.    [place] Breastfeeding Resources: 1-486-369-BABY (6026) toll free    National Breastfeeding Help Line trained breastfeeding peer counselors can help answer common breast-feeding questions by phone. Monday-Friday: English/Maori  [phone]- [phone] toll free, [phone] (TTY)    [facility] Connection: [phone]-[facility] (1209)

## 2021-06-03 VITALS
WEIGHT: 235 LBS | HEART RATE: 76 BPM | DIASTOLIC BLOOD PRESSURE: 70 MMHG | SYSTOLIC BLOOD PRESSURE: 118 MMHG | HEIGHT: 69 IN | BODY MASS INDEX: 34.8 KG/M2

## 2021-06-03 VITALS
HEIGHT: 69 IN | WEIGHT: 233 LBS | SYSTOLIC BLOOD PRESSURE: 114 MMHG | BODY MASS INDEX: 34.51 KG/M2 | OXYGEN SATURATION: 98 % | DIASTOLIC BLOOD PRESSURE: 76 MMHG | HEART RATE: 74 BPM

## 2021-06-03 VITALS — HEIGHT: 69 IN | WEIGHT: 232 LBS | BODY MASS INDEX: 34.36 KG/M2

## 2021-06-03 VITALS — HEIGHT: 69 IN | WEIGHT: 227.1 LBS | BODY MASS INDEX: 33.64 KG/M2

## 2021-06-03 NOTE — PROGRESS NOTES
Here alone today. Feeling well and offers no concerns today. GCT/hgb/RPR done today. Reviewed recommendation for tdap and accepts today, given. Due to blood type, raimundo screen done and given rhophylac. Describes a glut muscle pain that began prior to pregnancy and further injured in pregnancy with normal and even modified activities. Given recommendations and offered PT referral to further her efforts before end of pregnancy. Reviewed warning s/sx and reasons to call including PTL and decreased FM. RTC 2-4 weeks.

## 2021-06-03 NOTE — PATIENT INSTRUCTIONS - HE
[facility] Nurse Midwives - Contact information:  Appointment line and to get a hold of CNM in clinic Monday-Friday 8 am - 5 pm:  [phone].  There are some clinics with early start times (1st appointment 7:40 am) and others with evening hours (last appointment 6:20 pm).  Most are typically open from 8 am to 5 pm.    CNM on call answering service: [phone].  Specify your hospital of choice and leave a brief message for CNM;  will then page CNM who is on call at your specified hospital and you should receive a call back with 15 minutes.  Be sure that your ringer is audible and that you can accept blocked calls so that we can get back in touch with you! This number should be reserved for urgent needs if during the day, before 8 am, after 5 pm, weekends, holidays.    Contact the on-call CNM with warning signs, such as:    vaginal bleeding     Vaginal discharge and itching or pain and burning during urination    Leg/calf pain or swelling on one side    severe abdominal pain    nausea and vomiting more than 4-5 times a day, or if you are unable to keep anything down    fever more than 100.4 degrees F.      You are invited to  Meet the [facility] Nurse-Midwives  A way to tour the hospital Labor and Delivery unit and meet the midwives that attend births since you may not have the opportunity to meet them during your prenatal care.  Some sessions are informal meet and greet type social hours, others address a specific concern or topic.    Tuesday, Februrary 12, 2019 7-8pm  [facility]    Wednesday, April 17, 2019 7-8pm  [facility]    Thursday, August 15, 2019 7-8pm  [facility]    Wednesday November 13, 2019 7-8 pm  [facility]    Please call [phone] to register        Touring the Maternity Care Center  To schedule a tour at either [place] or [facility], please do so online using the  following links:  [place] - [email or web address]  [facility] - [email or web address]       DAILY FETAL MOVEMENT COUNTING    One of the best ways to keep track of a healthy baby is to notice its movements. Healthy babies are very active. However, some perfectly normal babies may sleep quietly as long as 60 minutes without moving. Babies who are having problems are sluggish and move less. Counting these movements can provide your nurse-midwife with a warning of developing problems.    You should begin this counting at the around your 7th to 8th month of your pregnancy (28-32 weeks) if you are ever concerned that there is less movement than normal for your baby.     INSTRUCTIONS    1.  If able, drink 2 glasses of fluid and lie down on one side.  Put your hand on your abdomen.  2. Count 10 separate times that the baby moves. A movement may be a kick, turn, or flip of the baby.  3.  Record the time you feel the 10th movement. When you count the 10th movement, stop counting.  4.  If one hour passes with less than 10 movements, drink a large glass of fruit juice. Then count for the another hour. If you do not reach 10 movements, call the midwives    REMEMBER      The baby may move all 10 times in an hour or less.    The baby may take up to five hours to move 10 times.    The important thing is to know what is normal for your baby so you can tell your nurse-midwife when something different is happening.    CALL THE NURSE-MIDWIFE IF:      You do not feel 10 movements in five hours.    You have not felt the baby move all day.    It is taking longer and longer each day to get the 10th movement.      Pregnancy: Your Third Trimester Changes  How You Are Changing  Your body is preparing for the birth of your baby. Some of the most common changes  are listed below. If you have any questions or concerns, ask your midwife.    You ll gain more weight from fluids, extra blood, and fat deposits. Your baby will gain an average of an ounce per day (a half a pound a week)!    Your breasts will grow as your body gets ready to feed the baby. They may be more tender. You may also notice a slight yellow or white discharge from the nipples.    Discharge from your vagina may increase. This is normal.    You might see some skin color changes on your forehead, cheeks, or nose. Most of these will go away after you deliver.  How Your Baby Is Growing    Month 7  Your baby is about 14 inches long. If born prematurely (too early), your baby would likely survive with special care.   Month 8  Your baby is building up body fat. Baby kicks strongly, but is getting too big to move around much.   Month 9  Your baby weighs nearly 7 pounds and is about 18-20 inches long. In other words, any day now...       UNDERSTANDING  LABOR    Going into labor before your 37th week of pregnancy is called  labor.  labor can cause your baby to be born too soon. This can lead to a number of health problems that may affect your baby. From 28-35 weeks, Patients are advised to be evaluated at [facility] since they have a  Intensive Care Unit (NICU).  -Before labor, the cervix is thick and closed.  -In  labor, the cervix begins to efface (thin) and dilate (open) over a short period of time    Are You At Risk?  Any pregnant woman can have  labor. It may start for no reason. But these risk factors can increase your chances:    Past  labor or past early birth    Smoking and drug or alcohol use during pregnancy    Multiple fetuses (twins or more)    Problems with the shape of the uterus    Bleeding during the pregnancy    The Dangers of  Birth  A baby born too soon may have health problems. This is because the baby didn t have enough  time to mature. The baby then is at risk of:    Not breastfeeding well    Having immature lungs    Bleeding in the brain    Dying    Reaching Term  Your goal is to get as close to term as you can before giving birth. The closer you get to term, the higher your chance of having a healthy baby. Work with your healthcare provider. Together, you can take steps that may keep you from giving birth too early.    Symptoms of  Labor  If you believe you re having  labor, contact the midwives right away. But contractions alone don t mean you re in  labor. What matters more are changes in your cervix (the lower end of the uterus).   Symptoms of  labor include:    five or more contractions per hour    Strong & frequent contractions    Constant menstrual-like cramping    Low-back pain        Mucous or bloody vaginal discharge    Bleeding or spotting in the second or third trimester    Evaluating  Labor  Your midwife will try to find out whether you re in  labor or whether you re just having contractions.She may watch you for a few hours. The following tests may be done:    Pelvic exam to see if your cervix has effaced (thinned) and dilated (opened)    Uterine activity monitoring to detect contractions    Fetal monitoring to check the health of your baby    Ultrasound to check your baby s size and position    Caring for Yourself At Home  If you have contractions  but your cervix is still thick and closed, the midwife may ask you to do the following at home:    Drink plenty of water.    Do fewer activities.    Rest in bed on your side.    Avoid intercourse and nipple stimulation.    When to Call Your Midwife    Five or more contractions per hour    Bag of water breaks    Bleeding or spotting     If You Need Hospital Care   labor often requires that you have hospital care and complete bed rest. You may have an IV (intravenous) line to get fluids. And you may be given pills or  an injection to help prevent contractions. Finally, you may receive medication (corticosteroids) that helps your baby s lungs mature more quickly.    Syphilis Screening:   The [place] Department of Health ([facility]) provided new recommendations for screening during pregnancy. If you are pregnant, [facility] recommends testing three times during your pregnancy: at your first visit, 28 weeks, and after delivery.   Syphilis is:     Caused by a bacteria spread by sexual contact    Rising among [place] women of child-bearing age  Syphilis can:     Be passed on to infants during pregnancy or during delivery and can be life threatening    Be cured. There are ways to protect yourself and your babies.        HEALTHY PREGNANCY CARE: 26-30 WEEKS PREGNANT    You are now in your last trimester of pregnancy. Your baby is growing rapidly, can open and close her eyelids, sometimes get hiccups, and you'll probably feel her moving around more often. Your baby has breathing movements and is gaining about one ounce each day. You may notice heartburn and leg cramps. Your back may ache as your body gets used to your baby's size and length.    Discuss your work situation with your midwife or physician as needed. If you stand for long periods of time, you may need to make changes and take breaks.    Pre-register after 30 weeks online at the hospital where your baby will be born [email or web address]      Be aware of your baby's activity level. You may be asked to do daily fetal movement counts. Contact your midwife or physician about any decreased movement.    You may have been tested for gestational diabetes today. If you are RH negative, you may have had an additional test and a Rhogam injection.    Consider receiving a Tdap vaccination to protect your baby from Pertussis/whooping cough.    Baby Feeding in the Hospital: Information, Support and Resources    As you prepare for the birth of your child, you will want  to consider options for feeding your baby including breast-feeding and/or baby formula. The American Academy of Pediatrics recommends exclusive breast-feeding for the first six months (although any amount of breast-feeding is beneficial).  However, we also understand that breast-feeding is a personal choice and not for everyone. Whether or not you choose to breast-feed, your decision will be respected by our staff.    There are numerous benefits of breast-feeding; here are a few to consider:    Provides antibodies to protect your baby from infections and diseases    The cost: formula can cost over $1,500 per year    Convenience, no warming up or sterilizing bottles and supplies    The physical contact with breastfeeding can make babies feel secure, warm and comforted    What ever my feeding choice, what can I expect after I deliver my baby?    Your baby will usually be placed skin-to-skin immediately following birth. The skin to skin contact between you and your baby will be a special and memorable time. The bonding and attachment comforts your baby and has a positive effect on baby s brain development.     Having your baby  room in  with you also helps you start to learn your baby s body rhythms and sleep cycle.      You will also begin to learn your baby s cues (signals) that he or she is ready to feed.    When do I start to feed my baby?  As soon as possible after your baby s birth, you will be encouraged to begin feeding.  In the first couple of weeks, your baby will eat often.  Breastfeeding babies usually eat at least 8 times in 24 hours.  Babies fed formula usually eat at least 7 times in 24 hours.      Breast-feeding tips:    Get comfortable and use pillows for support.    Have your baby at the level of your breast, facing you,  tummy to tummy .      Touch your nipple to your baby s lips so you baby s mouth opens wide (rooting reflex).  Aim the nipple toward the roof of your baby s mouth. When your baby  opens his or her mouth, pull your baby toward your breast to help your baby  latch on  to your nipple and much of the areola area.    Hand expressing your breast milk can assist with latching your baby to your breast, if needed.    Ask for help, breastfeeding may seem awkward or uncomfortable at first, this is normal. There are numerous resources available at [facility] Clinics and beyond.     If your goal is to exclusively breastfeed, avoid using any formula or artificial nipples (including bottles and pacifiers) while you are your baby are learning to breastfeed unless there is a medical reason.       Mixing breastfeeding and formula can interfere with how you begin building your milk supply.  It can impact how you and your baby  learn  to breastfeeding together and alter the natural growth of  good  bacteria in your baby s stomach.    Delay a pacifier or a bottle in the first few weeks until breastfeeding is well established. This is often around 3 weeks of age.    Ask your nurse to show you how to hand express.   Breast milk can be kept in the refrigerator or freezer for later use.    Hospital and Clinic  Resources:  -Schedule an appointment with a [facility] CNM who is also a Lactation Consultant by calling [phone]     -Schedule a clinic appointment with a [facility] CNM with dedicated clinic hours for breastfeeding assistance by calling [phone]. Breastfeeding clinic visits are at [facility] on Mondays, [facility] on Tuesdays and [facility] on Thursdays.     -Baby Café    Pregnant and interested in breastfeeding?  Need answers to breastfeeding questions?  Want to help breastfeeding moms?  Already breastfeeding and want to meet other moms?    Join us at the Baby Café!    Baby Cafe is a free, drop-in service offering breast-feeding support for pregnant women, breast-feeding mothers and their families.  Come share tips and socialize with other mothers.  Babies and siblings are  welcome (no childcare available).    Starting April 2018, Baby Café will be at 4 locations.  Please see below for the Baby Café closest to you!      [facility]  [address] [place] [address]  1st Wednesday: 10am-12pm    [facility]  [address] [place] [address]  3rd Wednesday 4-6pm    [facility]  [address] [place] [address]  4th Wednesday 10am-12:30pm    [facility]  [address] [place] [address]  4th Wednesdays: 4-6pm      Hmong, Luxembourgish, and [place]an which is may be available at some sites.    For more information, please contact: [email or web address]. or [phone]    -Attend a baby weigh in at [facility].  Lactation consultants are available to answer questions  [place]: Tuesdays 1:00 - 2:00  [facility]: Mondays 1:00 - 2:00  www.[facility]nt[facility]    -Attend one of the New Mama groups at [facility] in [facility].  [facility] also offers one-on-one in home and in office lactation consults.   www.[facility]    -Attend a Mehdi Mezaague meeting.  Multiple groups in several locations throughout the [facility]. The meetings are no-cost and always informative breastfeeding education session through [employer]eche League  Www.kiracandida.org/  Medication use while breastfeeding: http://toxnet.nlm.nih.gov/newtoxnet/lactmed.htm     Online Resources:    [facility].org/baby sign up for free online weekly e-mail    [facility].org/maternity    Breastfeedingmadesimple.com    li.org (La Leche League)    Normalfed.com    Womenshealth.gov/breastfeeding    Workandpump.com    Breast-feeding Supplies & Pumps:  Talk to your insurance provider or WIC (Women, Infants and Children) to learn more about options available to you. Recent health insurance changes may include additional coverage for supplies and pumps.    Public Health:  Women, Infants and Children Nutrition program (WIC):  provides breast-feeding support and education in addition to formal feeding moms. 831-XRH-2677 or http://www.health.[facility].[place]/divs/fh/wic    Family Health Home Visiting: Public Health Nurse home visits are available. Talk to your provider to see if you qualify. Most [facility] have a program available.    Additional Resources:  [employer] is an international, nonprofit, nonsectarian organization offering information, education, and support to mothers who want to breast-feed their babies. Local groups offer phone help and monthly meetings. Visit [place] [facility].Needl or Changers.Needl and us the  Find local support  drop down menu or click on the  Resources  tab.    [place] Breastfeeding Resources: 5-228-496-BABY (4527) toll free    National Breastfeeding Help Line trained breastfeeding peer counselors can help answer common breast-feeding questions by phone. Monday-Friday: English/Gabonese  1-966- 230-2026 toll free, 8-583-178-2145 (TTY)    [facility]: 697-645-[facility] (8605)      Resources:    Childbirth and Parenting Education:   [place] parenting center: http://DP7 DigitalSan Luis Rey HospitalrestOpolis/   (129) 566-BABY  [place]: (education, yoga & wellness) www.Tapastreet  Enlightened Mama: www.Tamar Energyightenedmama.Vidder   Childbirth collective: (Parent topic nights)  www.childbirthcollective.org/  Hypnobabies:  www.hypnobabiestwincities.com/  Hypnobirthing:  Http://hypnobirthing.com/    Book Recommendations:   Rach Janet's Birthing From Within--first few chapters include a new-age tone, you may prefer to skip it and keep going, because there is good stuff later.  This book recommendation covers emotional preparation, but does cover coping with pain, and use of both pharmacological and nonpharmacological methods.    Dr. Nielsen' The Pregnancy Book and The Birth Book--the pregnancy book goes month-by month    Womanly Art of Breastfeeding by La Leche [employer]   Bestfeeding by Nicolette Smith--great pictures    Mothering Your  "Nursing Toddler, by Noemy Chavez.   Addresses dealing with so many of the challenging behaviors of a nursing toddler.  How Weaning Happens, by La Leche League.  Discusses weaning at all ages, from medically necessary weaning of an infant, all the way up to age 5 (or older), with why/why not, and strategies.  Very empowering book both for deciding to wean and deciding not to.    American College of Nurse-Midwives (ACNM) http://www.midwife.org/; look at the informational handouts at http://www.midwife.org/Share-With-Women     www.mymidwife.org    Mother to Baby (Medication and Herbal guidance in pregnancy): http://www.mothertobaby.org  Toll-Free Hotline: 487.116.2424  LactMed (Medication use while breastfeeding): http://toxnet.nlm.nih.gov/newtoxnet/lactmed.htm    Women's Health.gov:  http://www.womenshealth.gov/a-z-topics/index.html    American pregnancy association - http://americanpregnancy.org    Centering Pregnancy (group prenatal care option): http://centeringhealthcare.org    Information about doulas:  Childbirth collective: http://www.childbirthcollective.org/  Doulas of [place] (АНДРЕЙ):  www.андрей.org  [facility]  project: http://twincitiesdoulaproject.com/     Early Childhood and Family Education (ECFE):  ECFE offers parents hands-on learning experiences that will nourish a lifetime of teachable moments.  http://ecfe.info/ecfe-home/    March of Dimes www.marchofdimes.com     FDA - Nutrition  www.mypyramid.gov  Under \"For Consumers,\" click on \"pregnant and breastfeeding women.\"      Centers for Disease Control and Prevention (CDC) - Vaccines : http://www.cdc.gov/vaccines/       When researching information on the web, question the validity of websites.  The domains .gov, .edu and.org tend to be more reliable information.  If there are a lot of advertisements, be cautious of the information provided. Stay away from blogs and chat rooms please!   "

## 2021-06-04 VITALS — HEIGHT: 69 IN | BODY MASS INDEX: 37.01 KG/M2

## 2021-06-04 VITALS
HEART RATE: 80 BPM | HEIGHT: 69 IN | DIASTOLIC BLOOD PRESSURE: 74 MMHG | BODY MASS INDEX: 35.5 KG/M2 | WEIGHT: 239.7 LBS | SYSTOLIC BLOOD PRESSURE: 110 MMHG

## 2021-06-04 VITALS
WEIGHT: 238 LBS | HEART RATE: 72 BPM | SYSTOLIC BLOOD PRESSURE: 110 MMHG | HEIGHT: 69 IN | BODY MASS INDEX: 35.25 KG/M2 | DIASTOLIC BLOOD PRESSURE: 66 MMHG

## 2021-06-04 VITALS — BODY MASS INDEX: 34.01 KG/M2 | WEIGHT: 227 LBS

## 2021-06-04 NOTE — PROGRESS NOTES
Mar is here alone. Notes normal FM and no UCs. Noting some discomforts with sleeping and waking to active FM. They are getting all the preparations ready for baby's arrival, still needs to confirm pump coverage with hospital grade pump system due to low milk supply. EPDS = 1; negative #10, reports no history and no concerns. Reviewed warning s/sx and resources. Advised on postpartum care visits. Reviewed upcoming labs and fetal presentation confirmation. Reviewed warning s/sx and reasons to call. RTC 2 weeks.

## 2021-06-04 NOTE — PROGRESS NOTES
Here alone today. Feeling well and notes normal FM and no regular UCs. Reviewed labs from last visit wnl. Advised on upcoming tasks. Time spent discussing her past BF experience and supports available. Reviewed timing of upcoming visits. Reviewed warning s/sx and reasons to call. rTC 2 weeks.

## 2021-06-04 NOTE — PATIENT INSTRUCTIONS - HE
[facility] Nurse Midwives - Contact information:  Appointment line and to get a hold of CNM in clinic Monday-Friday 8 am - 5 pm:  [phone].  There are some clinics with early start times (1st appointment 7:40 am) and others with evening hours (last appointment 6:20 pm).  Most are typically open from 8 am to 5 pm.    CNM on call answering service: [phone].  Specify your hospital of choice and leave a brief message for CNM;  will then page CNM who is on call at your specified hospital and you should receive a call back with 15 minutes.  Be sure that your ringer is audible and that you can accept blocked calls so that we can get back in touch with you! This number should be reserved for urgent needs if during the day, before 8 am, after 5 pm, weekends, holidays.    Contact the on-call CNM with warning signs, such as:    vaginal bleeding     Vaginal discharge and itching or pain and burning during urination    Leg/calf pain or swelling on one side    severe abdominal pain    nausea and vomiting more than 4-5 times a day, or if you are unable to keep anything down    fever more than 100.4 degrees F.        You are invited to  Meet the [facility] Nurse-Midwives  A way to tour the hospital Labor and Delivery unit and meet the midwives that attend births since you may not have the opportunity to meet them during your prenatal care.  Some sessions are informal meet and greet type social hours, others address a specific concern or topic.    Tuesday, Februrary 12, 2019 7-8pm  [facility]    Wednesday, April 17, 2019 7-8pm  [facility]    Thursday, August 15, 2019 7-8pm  [facility]    Wednesday November 13, 2019 7-8 pm  [facility]    Please call [phone] to register          Touring the [facility]  To schedule a tour at either [place] or [facility], please do so online using  "the following links:  [place] - [email or web address]  [facility] - [email or web address]      Pre-registration for Hospital Stay:  Sometime betweeen 30-37 weeks, it is recommended that you \"pre-register\" for your upcoming hospital stay on our website:    [email or web address]    Breastfeeding and Birth Control  How do I decide what birth control method is best for me while I am breastfeeding?  Choosing a method of birth control is very personal. First, answer the following questions:      Do you want to have more children?    How much spacing between births do you want for your children?    Do you smoke or have you had any health problems, such as liver disease or a blood clot?  Talk about the answers to each of these questions with your health care provider to help you choose the best method for you.    Can I use breastfeeding as my birth control?  Using breastfeeding as your birth control (the lactational amenorrhea method) can be a good way to keep from getting pregnant in the first months after the baby is born. Each time your baby nurses, your body releases a hormone called prolactin, which stops your body from making the hormones that cause you to ovulate (release an egg). If you are not ovulating, you cannot get pregnant.    The lactational amenorrhea method works only if:    you have not started your period yet.    you are breastfeeding only and not giving your baby any other food or drink.    you are breastfeeding at least every 4 hours during the day and every 6 hours at night.    your baby is less than 6 months old.  When any 1 of these 4 things is not happening, you no longer have good protection from getting pregnant, and you should use another form of birth control.    What birth control " methods are safe for me to use while I breastfeed?    Methods without hormones  Methods without hormones do not affect you, your baby, or your breastfeeding.  Methods without hormones that are the most effective    The copper intrauterine contraceptive device (IUD) (ParaGard) is a small, T-shaped device that is in- serted into your uterus (womb) through the vagina and cervix. The copper IUD lasts for 10 years.    Sterilization (getting your tubes tied or your partner having a vasectomy) is very effective, but it is per- manent. You should choose sterilization only if you do not want to have more children.  A method without hormones that is effective    The lactational amenorrhea method described above is effective for the first 6 months.  Methods without hormones that are less effective    Natural family planning is monitoring your body for signs of ovulation and not having sex when you think you are ovulating. This method is reliable only if you are having regular periods every month.    Barrier methods (condoms, diaphragms, sponges, and spermicides) are used at the time you have sex. These methods are effective only if you use them correctly every time.    Methods with hormones  Birth control methods that use hormones can be used while you are breastfeeding. They may have a small effect on lowering the amount of milk you make. All hormones will get into your breast milk in very small amounts, but there is no known harm to your baby from this small amount of hormone in breast milk.only methodsmethods use only 1 hormone, called progestin. You can start them right after your baby is born or wait 4 to 6 weeks to make sure your milk supply is good.     Progestin-only pills ( minipills ): If you like to take pills every day, you can use the minipill. In order forpill to work well, you have to take 1 at the same time each day. When you stop breastfeeding, you should start pills that have both estrogen and progestin  because they are better at keeping you from get- ting pregnant.     Progestin IUD (Mirena): The progestin IUD is shaped and inserted into the uterus like the copper IUD. It works for up to 5 years. Both IUDs are usually inserted 4 to 6 weeks after the baby is born.    Progestin implant (Nexplanon): The progestin implant is a small matchstick-sized flexible nicanor. It is placed into the fatty tissue in the back of your arm. It works for up to 3 years.    Progestin shot (Depo-Provera): The progestin shot is given every 3 months.    estrogen and progestin methods  These methods use 2 hormones, called estrogen and progestin.     These methods increase your risk of a blood clot, which is already higher than normal after you have a baby. You should not use them until your baby is at least 6 weeks old. The combined methods are not recommended as the first choice for women who are breastfeeding. If a combined method is the one that you feel will be best for you to prevent getting pregnant, these methods are okay to use while breastfeeding.     Combined birth control pills: You take a pill each day.    Vaginal ring (NuvaRing): The ring is worn in the vagina for 3 weeks then left out for 1 week before youin a new ring.    Patch (Ortho Evra): The patch is placed on your skin and changed every week for 3 weeks then left off forweek before putting a new patch on a different area of your skin.    Pediatric Care Providers at [facility]:    Choosing the right provider is one of the most important decisions you ll make about your health care. We can help you find the right one. Remember, you re looking for a provider you can trust and work with to improve your health and well-being, so take time to think about what you need. Depending on how complicated your health care needs are, you may need to see more than one type of provider.    Primary Care Providers: You ll see a primary care provider first for most health issues. They ll work  with you to get your recommended screenings, help you manage chronic conditions, and refer you to other types of providers if you need them. Your primary care provider may be called a family physician or doctor, internist, general practitioner, nurse practitioner, or physician s assistant. Your child or teenager s provider may be called a pediatrician.  Specialists: You ll see a specialist for certain services or to treat specific conditions. Specialists include cardiologists, oncologists, psychologists, allergists, podiatrists, and orthopedists. You may need a referral from your primary care provider before you go to a specialist in order for your health plan to pay for your visit.\pardHere are some tips for finding a provider where you live:  If you already have a provider you like and want to keep working with, call their office and ask if they accept your coverage.  Call your insurance company or state Medicaid and CHIP program. Look at their website or check your member handbook to find providers in your network who take your health coverage.  Ask your friends or family if they have providers they like and use these tools to compare health care providers in your area.    Family Medicine at [facility]: [email or web address]    Many of our families enjoy all seeing the same doctor, who comes to know the whole family very well. We base our practice on the knowledge of the patient in the context of family and community.  WHY CHOOSE A FAMILY MEDICINE PHYSICIAN?  Ability of the whole family to see the same doctor  Focus on the whole person, including physical and emotional health  A personal relationship with their doctor that is nurtured over time  Respect for individual and family beliefs and values  No need to change primary care providers when a certain age is reached  Coordination of care when other health care services are needed    Pediatrics at [facility]:    [email or web address]    Through a teaching affiliation with the [facility], [facility] staff keeps current on new developments in the field of pediatrics.     Everything we do centers around caring for children. We place special emphasis on wellness and prevention.pediatric care team includes a team of pediatricians and certified nurse practitioners who provide care to pediatric and adolescent patients ages 0 to 18, and some up to the age of 26. We offer preventive health maintenance for healthy children as well the diagnosis and treatment of common and chronic illnesses and injuries. In addition, we also offer several pediatric specialists who focus on adolescent health issues and developmental and behavioral issues.    Circumcision  What is circumcision?  At birth, baby boys have loose skin that covers the head of the penis. This skin is called the foreskin. When all or part of the foreskin of the penis is cut off, this is called circumcision.  Why is circumcision done?  Circumcision is done for many reasons including Yazidi, cultural, looks, and health. Some Yazidi groups circumcise all boys as a tonny-based practice. Many people in the [place] choose to circumcise their baby boys because they believe it is culturally normal. It is not a common practice in [place], [place], or [place].  Some parents choose circumcision so that their son will have a penis that looks like his father s if the father was also circumcised. Other people choose circumcision because they believe it is  or will protect the boy or man from infection or cancer later in life.  Does circumcision protect against infection or cancer?  Circumcision does seem to protect against some types of infection or cancer. Cancer of the penis is one type of cancer that circumcision may prevent. However, cancer of the penis is very rare. One hundred thousand circumcisions would need to  be done to prevent one case of cancer of the penis. Circumcision may also decrease the chance of some sexually transmitted infections, such as HIV and human papilloma virus (HPV). See the next page for more information on the risks and benefits of circumcision.  What happens during a circumcision?  Babies born in the hospital are usually circumcised before they go home. Health care providers also perform circumcisions in their offices and clinics within a few weeks after birth.  Shinto circumcisions are most often done at home or in a Congregation.  Before the circumcision is performed, some providers give an injection (shot) of a small amount of anesthetic (numbing medicine) at the base of the penis to block the pain or put an anesthetic cream on the penis to numb the area that will be cut.  There are 2 different ways to do a circumcision. In one type, a clamp placed around the head of the penis cuts off the blood supply to the foreskin, and the foreskin above the clamp is cut off. The clamp is left on the penis until the area heals and it falls off a few days later. In another type of circumcision, the foreskin is cut off with scissors or a scalpel.  After the circumcision, petroleum jelly and sometimes gauze may be put over the area of the penis where the skin was removed. This protects the end of the penis while it heals.  Can I keep my son s penis  if it is circumcised?  Regular washing with soap and water will keep any penis clean. Circumcision does not make the penis . Uncircumcised boys do need to be taught to clean beneath their foreskin, just like they need to be taught to wash their hands or brush their teeth.  How do I decide if I should have my son circumcised?  The American Academy of Pediatrics (AAP) says that  circumcision may have health benefits. They do not recommend circumcision for all boys as a routine procedure. The AAP recommends that you talk to your health care provider  to decide if circumcision is the right choice for your family. You may also wish to discuss the question with your family or .  What are the risks and benefits of circumcision?  We do not have a lot of good scientific information about the health risks or benefits of circumcision.  Possible Risks:  Very few baby boys (less than 1 in 100) will have a problem after circumcision, such as bleeding or mild infection of the penis. These problems are usually not serious and are easy to treat.  Less common problems are:    Removal of too much or too little foreskin  Some rare problems are:    Narrowing of the opening of the penis, which can cause problems with urination    Removal of part of the penis or death of some of the other skin on the penis   Infection that spreads to other parts of the body  People used to think babies did not really feel pain. Now we know that they do. Many baby boys appear to feel a lot of pain during circumcision if anesthesia is not used.  We do not know if circumcision affects sexual function or sensation.  Possible Benefits:    Less risk for some kinds of cancers, like cancer of the penis    Fewer urinary tract (bladder or kidney) infections for babies    Less risk for some sexually transmitted infections, such as HIV, herpes, and HPV     May protect female sexual partners from some sexually transmitted infections    For More Information  American Academy of Family Physicians: Circumcision  http://familydoctor.org/familydoctor/en/pregnancy-newborns/caring-for-newborns/infant- care/circumcision.html  MedlinePlus: Circumcision (includes a slide show on the procedure)  www.nlm.nih.gov/medlineplus/circumcision.html  American Academy of Pediatrics: Policy statement on circumcision  Http://pediatrics.aappublications.org/content/130/3/e756.abstract      Childbirth and Parenting Education:   [facility] center: http://Market Force Information/   [phone] [phone]  Jeremie: (education,  yoga & wellness) www.[employer]  Enlightened Mama: www.enlightenedmama.com   Childbirth collective: (Parent topic nights)  www.childbirthcollective.org/  Hypnobabies:  www.hypnobabiestwincities.com/  Hypnobirthing:  Http://hypnobirthing.com/    Book Recommendations:   Rach Sitka's Birthing From Within--first few chapters include a new-age tone, you may prefer to skip it and keep going, because there is good stuff later.  This book recommendation covers emotional preparation, but does cover coping with pain, and use of both pharmacological and nonpharmacological methods.    Dr. Nielsen' The Pregnancy Book and The Birth Book--the pregnancy book goes month-by month    Womanly Art of Breastfeeding by La Leche League International   Bestfeeding by Nicolette Smith--great pictures    Mothering Your Nursing Toddler, by Noemy Chaevz.   Addresses dealing with so many of the challenging behaviors of a nursing toddler.  How Weaning Happens, by La Leche League.  Discusses weaning at all ages, from medically necessary weaning of an infant, all the way up to age 5 (or older), with why/why not, and strategies.  Very empowering book both for deciding to wean and deciding not to.    American College of Nurse-Midwives (ACNM) http://www.midwife.org/; look at the informational handouts at http://www.midwife.org/Share-With-Women     www.mymidwife.org    Mother to Baby (Medication and Herbal guidance in pregnancy): http://www.mothertobaby.org  Toll-Free Hotline: 916.842.3846  LactMed (Medication use while breastfeeding): http://toxnet.nlm.nih.gov/newtoxnet/lactmed.htm    Women's Health.gov:  http://www.womenshealth.gov/a-z-topics/index.html    American pregnancy association - http://americanpregnancy.org    Centering Pregnancy (group prenatal care option): http://centeringhealthcare.org    Information about doulas:  Childbirth collective: http://www.childbirthcollective.org/  Doulas of [place] ([place]):  www.norberto.org  [facility]  " project: http://First Wavetiesdoulaproject.com/     Early Childhood and Family Education (ECFE):  ECFE offers parents hands-on learning experiences that will nourish a lifetime of teachable moments.  http://ecfe.info/ecfe-home/    March of Dimes www.MessageMe     FDA - Nutrition  www.mypyramid.gov  Under \"For Consumers,\" click on \"pregnant and breastfeeding women.\"      Centers for Disease Control and Prevention (CDC) - Vaccines : http://www.cdc.gov/vaccines/       When researching information on the web, question the validity of websites.  The [place] .gov, .edu and.org tend to be more reliable information.  If there are a lot of advertisements, be cautious of the information provided. Stay away from blogs and chat rooms please!   "

## 2021-06-04 NOTE — PATIENT INSTRUCTIONS - HE
[facility] Nurse Midwives - Contact information:  Appointment line and to get a hold of CNM in clinic Monday-Friday 8 am - 5 pm:  [phone].  There are some clinics with early start times (1st appointment 7:40 am) and others with evening hours (last appointment 6:20 pm).  Most are typically open from 8 am to 5 pm.    CNM on call answering service: [phone].  Specify your hospital of choice and leave a brief message for CNM;  will then page CNM who is on call at your specified hospital and you should receive a call back with 15 minutes.  Be sure that your ringer is audible and that you can accept blocked calls so that we can get back in touch with you! This number should be reserved for urgent needs if during the day, before 8 am, after 5 pm, weekends, holidays.    Contact the on-call CNM with warning signs, such as:    vaginal bleeding     Vaginal discharge and itching or pain and burning during urination    Leg/calf pain or swelling on one side    severe abdominal pain    nausea and vomiting more than 4-5 times a day, or if you are unable to keep anything down    fever more than 100.4 degrees F.        You are invited to  Meet the [facility] Nurse-Midwives  A way to tour the hospital Labor and Delivery unit and meet the midwives that attend births since you may not have the opportunity to meet them during your prenatal care.  Some sessions are informal meet and greet type social hours, others address a specific concern or topic.    Tuesday, Februrary 12, 2019 7-8pm  [facility]    Wednesday, April 17, 2019 7-8pm  [facility]    Thursday, August 15, 2019 7-8pm  [facility]    Wednesday November 13, 2019 7-8 pm  [facility]    Please call [phone] to register          Touring the Maternity Care Center  To schedule a tour at either [place] or [facility], please do so online using  "the following links:  [place] - [email or web address]  [facility] - [email or web address]      Pre-registration for Hospital Stay:  Sometime betweeen 30-37 weeks, it is recommended that you \"pre-register\" for your upcoming hospital stay on our website:    [email or web address]    Breastfeeding and Birth Control  How do I decide what birth control method is best for me while I am breastfeeding?  Choosing a method of birth control is very personal. First, answer the following questions:      Do you want to have more children?    How much spacing between births do you want for your children?    Do you smoke or have you had any health problems, such as liver disease or a blood clot?  Talk about the answers to each of these questions with your health care provider to help you choose the best method for you.    Can I use breastfeeding as my birth control?  Using breastfeeding as your birth control (the lactational amenorrhea method) can be a good way to keep from getting pregnant in the first months after the baby is born. Each time your baby nurses, your body releases a hormone called prolactin, which stops your body from making the hormones that cause you to ovulate (release an egg). If you are not ovulating, you cannot get pregnant.    The lactational amenorrhea method works only if:    you have not started your period yet.    you are breastfeeding only and not giving your baby any other food or drink.    you are breastfeeding at least every 4 hours during the day and every 6 hours at night.    your baby is less than 6 months old.  When any 1 of these 4 things is not happening, you no longer have good protection from getting pregnant, and you should use another form of birth control.    What birth control " methods are safe for me to use while I breastfeed?    Methods without hormones  Methods without hormones do not affect you, your baby, or your breastfeeding.  Methods without hormones that are the most effective    The copper intrauterine contraceptive device (IUD) (ParaGard) is a small, T-shaped device that is in- serted into your uterus (womb) through the vagina and cervix. The copper IUD lasts for 10 years.    Sterilization (getting your tubes tied or your partner having a vasectomy) is very effective, but it is per- manent. You should choose sterilization only if you do not want to have more children.  A method without hormones that is effective    The lactational amenorrhea method described above is effective for the first 6 months.  Methods without hormones that are less effective    Natural family planning is monitoring your body for signs of ovulation and not having sex when you think you are ovulating. This method is reliable only if you are having regular periods every month.    Barrier methods (condoms, diaphragms, sponges, and spermicides) are used at the time you have sex. These methods are effective only if you use them correctly every time.    Methods with hormones  Birth control methods that use hormones can be used while you are breastfeeding. They may have a small effect on lowering the amount of milk you make. All hormones will get into your breast milk in very small amounts, but there is no known harm to your baby from this small amount of hormone in breast milk.only methodsmethods use only 1 hormone, called progestin. You can start them right after your baby is born or wait 4 to 6 weeks to make sure your milk supply is good.     Progestin-only pills ( minipills ): If you like to take pills every day, you can use the minipill. In order forpill to work well, you have to take 1 at the same time each day. When you stop breastfeeding, you should start pills that have both estrogen and progestin  because they are better at keeping you from get- ting pregnant.     Progestin IUD (Mirena): The progestin IUD is shaped and inserted into the uterus like the copper IUD. It works for up to 5 years. Both IUDs are usually inserted 4 to 6 weeks after the baby is born.    Progestin implant (Nexplanon): The progestin implant is a small matchstick-sized flexible nicanor. It is placed into the fatty tissue in the back of your arm. It works for up to 3 years.    Progestin shot (Depo-Provera): The progestin shot is given every 3 months.    estrogen and progestin methods  These methods use 2 hormones, called estrogen and progestin.     These methods increase your risk of a blood clot, which is already higher than normal after you have a baby. You should not use them until your baby is at least 6 weeks old. The combined methods are not recommended as the first choice for women who are breastfeeding. If a combined method is the one that you feel will be best for you to prevent getting pregnant, these methods are okay to use while breastfeeding.     Combined birth control pills: You take a pill each day.    Vaginal ring (NuvaRing): The ring is worn in the vagina for 3 weeks then left out for 1 week before youin a new ring.    Patch (Ortho Evra): The patch is placed on your skin and changed every week for 3 weeks then left off forweek before putting a new patch on a different area of your skin.    Pediatric Care Providers at [facility]:    Choosing the right provider is one of the most important decisions you ll make about your health care. We can help you find the right one. Remember, you re looking for a provider you can trust and work with to improve your health and well-being, so take time to think about what you need. Depending on how complicated your health care needs are, you may need to see more than one type of provider.    Primary Care Providers: You ll see a primary care provider first for most health issues. They ll work  with you to get your recommended screenings, help you manage chronic conditions, and refer you to other types of providers if you need them. Your primary care provider may be called a family physician or doctor, internist, general practitioner, nurse practitioner, or physician s assistant. Your child or teenager s provider may be called a pediatrician.  Specialists: You ll see a specialist for certain services or to treat specific conditions. Specialists include cardiologists, oncologists, psychologists, allergists, podiatrists, and orthopedists. You may need a referral from your primary care provider before you go to a specialist in order for your health plan to pay for your visit.\pardHere are some tips for finding a provider where you live:  If you already have a provider you like and want to keep working with, call their office and ask if they accept your coverage.  Call your insurance company or state Medicaid and CHIP program. Look at their website or check your member handbook to find providers in your network who take your health coverage.  Ask your friends or family if they have providers they like and use these tools to compare health care providers in your area.    Family Medicine at [facility]: [email or web address]    Many of our families enjoy all seeing the same doctor, who comes to know the whole family very well. We base our practice on the knowledge of the patient in the context of family and community.  WHY CHOOSE A FAMILY MEDICINE PHYSICIAN?  Ability of the whole family to see the same doctor  Focus on the whole person, including physical and emotional health  A personal relationship with their doctor that is nurtured over time  Respect for individual and family beliefs and values  No need to change primary care providers when a certain age is reached  Coordination of care when other health care services are needed    Pediatrics at [facility]:    [email or web address]    Through a teaching affiliation with the [facility], [facility] staff keeps current on new developments in the field of pediatrics.     Everything we do centers around caring for children. We place special emphasis on wellness and prevention.pediatric care team includes a team of pediatricians and certified nurse practitioners who provide care to pediatric and adolescent patients ages 0 to 18, and some up to the age of 26. We offer preventive health maintenance for healthy children as well the diagnosis and treatment of common and chronic illnesses and injuries. In addition, we also offer several pediatric specialists who focus on adolescent health issues and developmental and behavioral issues.    Circumcision  What is circumcision?  At birth, baby boys have loose skin that covers the head of the penis. This skin is called the foreskin. When all or part of the foreskin of the penis is cut off, this is called circumcision.  Why is circumcision done?  Circumcision is done for many reasons including Restorationist, cultural, looks, and health. Some Restorationist groups circumcise all boys as a tonny-based practice. Many people in the [place] choose to circumcise their baby boys because they believe it is culturally normal. It is not a common practice in [place], [place], or [place].  Some parents choose circumcision so that their son will have a penis that looks like his father s if the father was also circumcised. Other people choose circumcision because they believe it is  or will protect the boy or man from infection or cancer later in life.  Does circumcision protect against infection or cancer?  Circumcision does seem to protect against some types of infection or cancer. Cancer of the penis is one type of cancer that circumcision may prevent. However, cancer of the penis is very rare. One hundred thousand circumcisions would need to  be done to prevent one case of cancer of the penis. Circumcision may also decrease the chance of some sexually transmitted infections, such as HIV and human papilloma virus (HPV). See the next page for more information on the risks and benefits of circumcision.  What happens during a circumcision?  Babies born in the hospital are usually circumcised before they go home. Health care providers also perform circumcisions in their offices and clinics within a few weeks after birth.  Confucianism circumcisions are most often done at home or in a Alevism.  Before the circumcision is performed, some providers give an injection (shot) of a small amount of anesthetic (numbing medicine) at the base of the penis to block the pain or put an anesthetic cream on the penis to numb the area that will be cut.  There are 2 different ways to do a circumcision. In one type, a clamp placed around the head of the penis cuts off the blood supply to the foreskin, and the foreskin above the clamp is cut off. The clamp is left on the penis until the area heals and it falls off a few days later. In another type of circumcision, the foreskin is cut off with scissors or a scalpel.  After the circumcision, petroleum jelly and sometimes gauze may be put over the area of the penis where the skin was removed. This protects the end of the penis while it heals.  Can I keep my son s penis  if it is circumcised?  Regular washing with soap and water will keep any penis clean. Circumcision does not make the penis . Uncircumcised boys do need to be taught to clean beneath their foreskin, just like they need to be taught to wash their hands or brush their teeth.  How do I decide if I should have my son circumcised?  The American Academy of Pediatrics (AAP) says that  circumcision may have health benefits. They do not recommend circumcision for all boys as a routine procedure. The AAP recommends that you talk to your health care provider  to decide if circumcision is the right choice for your family. You may also wish to discuss the question with your family or .  What are the risks and benefits of circumcision?  We do not have a lot of good scientific information about the health risks or benefits of circumcision.  Possible Risks:  Very few baby boys (less than 1 in 100) will have a problem after circumcision, such as bleeding or mild infection of the penis. These problems are usually not serious and are easy to treat.  Less common problems are:    Removal of too much or too little foreskin  Some rare problems are:    Narrowing of the opening of the penis, which can cause problems with urination    Removal of part of the penis or death of some of the other skin on the penis   Infection that spreads to other parts of the body  People used to think babies did not really feel pain. Now we know that they do. Many baby boys appear to feel a lot of pain during circumcision if anesthesia is not used.  We do not know if circumcision affects sexual function or sensation.  Possible Benefits:    Less risk for some kinds of cancers, like cancer of the penis    Fewer urinary tract (bladder or kidney) infections for babies    Less risk for some sexually transmitted infections, such as HIV, herpes, and HPV     May protect female sexual partners from some sexually transmitted infections    For More Information  American Academy of Family Physicians: Circumcision  http://familydoctor.org/familydoctor/en/pregnancy-newborns/caring-for-newborns/infant- care/circumcision.html  MedlinePlus: Circumcision (includes a slide show on the procedure)  www.nlm.nih.gov/medlineplus/circumcision.html  American Academy of Pediatrics: Policy statement on circumcision  Http://pediatrics.aappublications.org/content/130/3/e756.abstract      Childbirth and Parenting Education:   [facility] center: http://Ying[employer] Licai/   [phone]  Jeremie: (education,  yoga & wellness) www.[employer]  Enlightened Mama: www.enlightenedmama.com   Childbirth collective: (Parent topic nights)  www.childbirthcollective.org/  Hypnobabies:  www.hypnobabiestwincities.com/  Hypnobirthing:  Http://hypnobirthing.com/    Book Recommendations:   Rach [place]'s Birthing From Within--first few chapters include a new-age tone, you may prefer to skip it and keep going, because there is good stuff later.  This book recommendation covers emotional preparation, but does cover coping with pain, and use of both pharmacological and nonpharmacological methods.    Dr. Nielsen' The Pregnancy Book and The Birth Book--the pregnancy book goes month-by month    Womanly Art of Breastfeeding by La Leche League International   Bestfeeding by Nicolette Smith--great pictures    Mothering Your Nursing Toddler, by Noemy Chavez.   Addresses dealing with so many of the challenging behaviors of a nursing toddler.  How Weaning Happens, by La Leche League.  Discusses weaning at all ages, from medically necessary weaning of an infant, all the way up to age 5 (or older), with why/why not, and strategies.  Very empowering book both for deciding to wean and deciding not to.    American College of Nurse-Midwives (ACNM) http://www.midwife.org/; look at the informational handouts at http://www.midwife.org/Share-With-Women     www.mymidwife.org    Mother to Baby (Medication and Herbal guidance in pregnancy): http://www.mothertobaby.org  Toll-Free Hotline: 621.456.7927  LactMed (Medication use while breastfeeding): http://toxnet.nlm.nih.gov/newtoxnet/lactmed.htm    Women's Health.gov:  http://www.womenshealth.gov/a-z-topics/index.html    American pregnancy association - http://americanpregnancy.org    Centering Pregnancy (group prenatal care option): http://centeringhealthcare.org    Information about doulas:  Childbirth collective: http://www.childbirthcollective.org/  Doulas of [place] (NOR[place]ERT[place]):  www.norberto.org  [facility]  " project: http://Ecwidtiesdoulaproject.com/     Early Childhood and Family Education (ECFE):  ECFE offers parents hands-on learning experiences that will nourish a lifetime of teachable moments.  http://ecfe.info/ecfe-home/    March of Dimes www.[employer]     FDA - Nutrition  www.mypyramid.gov  Under \"For Consumers,\" click on \"pregnant and breastfeeding women.\"      Centers for Disease Control and Prevention (CDC) - Vaccines : http://www.cdc.gov/vaccines/       When researching information on the web, question the validity of websites.  The [employer] .gov, .edu and.org tend to be more reliable information.  If there are a lot of advertisements, be cautious of the information provided. Stay away from blogs and chat rooms please!   "

## 2021-06-05 NOTE — PROGRESS NOTES
Here alone today. Feeling well and offers no concerns today. NOtes active FM and no regular UCs or signs of labor. MIL is coming for the weekend from [facility]. Reviewed post-dates management, elects expectant management. Reviewed warning s/sx and reasons to call. RTC 1 week.

## 2021-06-05 NOTE — PROGRESS NOTES
Here alone today. Feeling well and offers no concerns today. Notes daily active and vigorous FM. Desires breast pump today; given today. GBS/hgb screen done today. VE done today, uncomfortable with exam due to posterior and high fetal station, and confirms vertex with sutures palpated; see flow sheet for exam details. Reviewed warning s/sx and reasons to call. RTC weekly through ELISA.

## 2021-06-05 NOTE — PATIENT INSTRUCTIONS - HE
[facility] Nurse Midwives - Contact information:  Appointment line and to get a hold of CNM in clinic Monday-Friday 8 am - 5 pm:  [phone].  There are some clinics with early start times (1st appointment 7:40 am) and others with evening hours (last appointment 6:20 pm).  Most are typically open from 8 am to 5 pm.    CNM on call answering service: [phone].  Specify your hospital of choice and leave a brief message for CNM;  will then page CNM who is on call at your specified hospital and you should receive a call back with 15 minutes.  Be sure that your ringer is audible and that you can accept blocked calls so that we can get back in touch with you! This number should be reserved for urgent needs if during the day, before 8 am, after 5 pm, weekends, holidays.    Contact the on-call CNM with warning signs, such as:    vaginal bleeding     Vaginal discharge and itching or pain and burning during urination    Leg/calf pain or swelling on one side    severe abdominal pain    nausea and vomiting more than 4-5 times a day, or if you are unable to keep anything down    fever more than 100.4 degrees F.         You are invited to  Meet the [facility] Nurse-Midwives  A way to tour the hospital Labor and Delivery unit and meet the midwives that attend births since you may not have the opportunity to meet them during your prenatal care.  Some sessions are informal meet and greet type social hours, others address a specific concern or topic.    Thursday, Februrary 22, 2018 7-8pm  [facility]  Social Hour    Thursday, April 19, 2018 7-8pm  [facility]  Exercise, nutrition and weight gain    Tuesday, June 28, 2018 7-8pm  [facility]  Postpartum depression/anxiety    Thursday August 23, 2018 7-8 pm  [facility]  Physiology of birth and breastfeeding, Pediatrician presentation    Thursday October 18,  2018  7-8pm,  [facility]  Social Hour    Please call [phone] to register        Touring the Maternity Care Center  To schedule a tour at either [place] or [facility], please do so online using the following links:  [facility] - [email or web address]  [facility] - [email or web address]    Childbirth and Parenting Education:   [facility]: http://GinzaMetricsMontefiore Health SystemHistoPathway/   [phone]  [place]: (education, yoga & wellness) www.Yogurt3D Engine  Enlightened Mama: www.Spartacus Medicalenedmama.BreatheAmerica   Childbirth collective: (Parent topic nights)  www.childbirthcollective.org/  Hypnobabies:  www.hypnobabiestwincities.com/  Hypnobirthing:  Http://hypnobirthing.com/    Breastfeeding Information:  An excellent 15-minute video on preparing for a good breastfeeding experience, including how to ensure you have a good milk supply and a comfortable latch, can be found here:  https://MeinProspekt.BreatheAmerica/      Book Recommendations:   Rach Janet's Birthing From [facility]--first few chapters include a new-age tone, you may prefer to skip it and keep going, because there is good stuff later.  This book recommendation covers emotional preparation, but does cover coping with pain, and use of both pharmacological and nonpharmacological methods.    Dr. Nielsen' The Pregnancy Book and The Birth Book--the pregnancy book goes month-by month    Womanly Art of Breastfeeding by La Leche League International   Bestfeeding by Nicolette Smith--great pictures    Mothering Your Nursing Toddler, by Noemy Chavez.   Addresses dealing with so many of the challenging behaviors of a nursing toddler.  How Weaning Happens, by La Leche League.  Discusses weaning at all ages, from medically necessary weaning of an infant, all the way up to age 5 (or  "older), with why/why not, and strategies.  Very empowering book both for deciding to wean and deciding not to.    American College of Nurse-Midwives (ACNM) http://www.midwife.org/; look at the informational handouts at http://www.midwife.org/Share-With-Women     www.mymidwife.org    Mother to Baby (Medication and Herbal guidance in pregnancy): http://www.mothertobaby.org  Toll-Free Hotline: 174.182.8930  LactMed (Medication use while breastfeeding): http://toxnet.nlm.nih.gov/newtoxnet/lactmed.htm    Women's Health.gov:  http://www.womenshealth.gov/a-z-topics/index.html    American pregnancy association - http://americanpregnancy.org    Centering Pregnancy (group prenatal care option): http://centeringhealthcare.org    Information about doulas:  Childbirth collective: http://www.childbirthcollective.org/  Doulas of [place] ([place]):  www.norberto.org  [facility]  project: http://Maker Mediacitiesdoulaproject.com/     Early Childhood and Family Education (ECFE):  ECFE offers parents hands-on learning experiences that will nourish a lifetime of teachable moments.  http://ecfe.info/ecfe-home/     [place] www.Qello.[facility]     FDA - Nutrition  www.mypyramid.gov  Under \"For Consumers,\" click on \"pregnant and breastfeeding women.\"      Centers for Disease Control and Prevention (CDC) - Vaccines : http://www.cdc.gov/vaccines/       When researching information on the web, question the validity of websites.  The domains .gov, .edu and.org tend to be more reliable information.  If there are a lot of advertisements, be cautious of the information provided. Stay away from blogs and chat rooms please!      Screening Program  Http://www.health.[facility].[place]/newbornscreening/  [place] newborns are tested soon after birth for more than 50 hidden, rare disorders, including hearing loss and critical congenital heart disease (CCHD). This site provides resources and information for families and providers.    HEALTHY " PREGNANCY CARE: 37 to 41 WEEKS PREGNANT    Talk with your midwife or physician about when to call with signs of labor    Regular uterine contractions that are getting closer together and/or stronger    If you think your water has broken or is leaking    Bleeding from the vagina like a period (bloody vaginal discharge is normal)    If you are not feeling your baby move    Make plans for transportation and  as needed for when you are going to the hospital.    Your midwife or physician may offer to check your cervix for changes.     Ask your health care provider about vaccinations you may need following delivery. By now, you should have received a Tdap immunization to protect against pertussis or whooping cough. Fathers and family members who will be in close contact with the baby should also receive a Tdap shot at least two weeks before the expected birth of the baby if they have not had a Td (tetanus) shot for at least two years.    If you are past your due date, discuss the next steps leading to delivery with your midwife or physician. If you don't start labor on your own by 41 or 42 weeks, your midwife or physician may recommend giving you medicines to ripen your cervix and start labor.  Induction of labor: http://onlinelibrary.verde.com/store/10.1016/j.jmwh.2008.04.018/asset/j.jmwh.2008.04.018.pdf?v=1&t=lshg4cvd&y=[email or web address]    Preparing for your baby: Tell your midwife or physician how you plan to feed your baby (breast or bottle), who you have chosen to do pediatric care for your baby, and if you have a boy, whether you have chosen to have him circumcised. You will need a car seat correctly installed in your vehicle to bring your baby home. As you start to set up the nursery at home for your baby, make sure the crib is safe. The mattress needs to fit snugly against the edges of the crib. If you can fit a soda can between the bars, they are too far apart and can allow the  baby's head to caught between them.    Learn about infant care and feeding, including information about infant CPR. We recommend that you put your baby to sleep on his or her back to reduce the chance of Sudden Infant Death Syndrome (SIDS). To maintain a healthy environment in which your child can grow, it's best to keep your home smoke-free. By preparing ahead, your transition into parenthood will go smoothly for you and your baby.    Your midwife or physician will want to see you for a checkup 2 to 6 weeks after delivery.    If you have questions about any symptoms you are experiencing or any other concerns, call your provider or their clinic staff at [facility] at Phone: [phone]. If it's after clinic hours, physician patients should call the [facility] Connection at [phone] ([phone]); midwife patients should call their answering service at [phone].    How can you care for yourself at home?   You can refer to the Starting Out Right book or find it online at http://www.healtheast.org/images/stories/maternity/[facility]-Starting-Out-Right.pdf or http://www.[facility].org/images/stories/flipbooks/[facility]-starting-out-right/[facility]-starting-out-right.html#p=8     You can sign up for a weekly parenting e-mail that gives support, tips and advice from health care professionals that starts with pregnancy and continues through the toddler years. To register, go to www.[facility]Le[facility].org/baby at any time during your pregnancy.        Making Plans for Feeding My Baby    By this point, you probably have read a lot about feeding your baby.  Breastfeed or formula? Each mother s decision is her own and [facility] respects you and your choices. We ve gathered information on both breastfeeding and formula feeding to help with your decision. Talking with your physician or nurse-midwife can also help in your decision.  However you plan to feed your baby, [facility] Centers encourage  rooming in with your baby, skin-to-skin contact and feeding your baby based on his or her cues.    Skin-to-skin contact  Being close to mom helps your baby adjust to life outside of the womb.  It helps your baby regulate their body temperature, heart rate, and breathing.  Your baby will usually be placed skin-to-skin immediately following birth or as soon as possible, if medical intervention is needed.    Rooming-In  Having your baby stay with you in your room is called  rooming-in .  Keeping your baby in your room helps you to learn how to care for your baby by getting to know your baby s cues, body rhythms and sleep cycle.       Cue-based feeding  Cues (signals) are baby s way of telling you what he or she wants.  When you learn your infant s cues, you know how to care for and feed your baby.   Feeding cues are the licking and smacking of lips, bringing their fist to their mouth, and a reflex called  rooting - where baby turns and opens his or her mouth, searching for the breast or bottle.  Crying is a late feeding cue.  Babies can feed frequently, often at least 8 times in 24 hours.    Breastfeeding facts  Breast milk is the best source of nutrition for your baby and is available at birth. In the first couple of days, your milk volume is already starting to increase, though it may not be noticeable. Breastfeed frequently to increase your milk supply. Within three to five days, you will begin to notice larger milk volumes. An increase in breast size, heaviness and firmness are often described as the milk  coming in.  Frequent breastfeeding can help breasts from getting overly firm and painful. You will know the baby is getting enough milk if your baby is having wet and dirty diapers and gaining weight.     If your goal is to exclusively breastfeed, it is important to not use any formula or artificial nipples (including bottles and pacifiers) while your baby is learning to breastfeed.  While it may seem like an   easy  option to give your baby a bottle, formula should only be given if there is a medical reason for your baby to have it.      Positioning and attachment   Get comfortable.  Use pillows as needed to support your arms and baby.  Hold baby close at the level of your breast, facing you in a tummy to tummy position.  Skin to skin helps with this.  Position the baby with his or her nose by the nipple.  There should be a straight line from baby s ear to shoulder to hips.  Tickle your baby s lips or wait for baby to open mouth wide, bring baby to breast by leading with the chin.  Aim the nipple at the roof of baby s mouth.  A rapid sucking pattern is followed by longer, drawing pattern with occasional swallows heard.  When baby is correctly latched, your nipple and much of the areola are pulled well into baby s mouth.      Returning to work or school  Focus on a good start to breastfeeding.  Many women continue to provide breastmilk for their baby when they return to work or school.  Making plans about where to pump and store milk can make the transition go well.  Talk with other mothers who have also returned to work or school for tips and support.  Your employer s Human Resource department may be a resource as well.     Returning to work or school: (continued)     babies can mean fewer  sick  days for you.    A quality breast pump will also save time and add comfort.  Check with your insurance prior to giving birth for breast pump coverage.  Many insurance companies include a pump within your benefits.    Wait until your baby is at least three weeks old to introduce a bottle for the first time.  Have someone besides you give the bottle.    Breastfeed when you are with your baby. Reserve your bottles of breast milk for when you are away.     Your breasts will need to be  emptied  either by your baby or a pump.  Plan to pump at least twice in an eight hour day.    If you cannot pump at work, continue  breastfeeding at home. Any amount of breast milk is worth giving to your baby.    Formula feeding facts  If you are planning to use formula to feed your baby, you will want to make some preparations ahead of time. Talk to your doctor or nurse-midwife about what type of formula to use. Some are iron-fortified, meaning they have extra iron in them. You will want to purchase formula and bottles before your baby is born to be sure you are ready after you return from the hospital. The [facility] do not provide formula samples to take home.    Be sure to follow formula mixing directions closely. Regular milk in the dairy case at the grocery store should not be given to babies under 1 year old. Baby formula is sold in several forms including:    Ready-to-use. This is the most expensive, but no mixing is necessary.    Concentrated liquid. This is less expensive than ready-to-use and you mix with water.    Powder. This is the least expensive. You mix one level scoop of powdered formula with two ounces of water and stir well.    Most babies need 2.5 ounces of formula per pound of body weight each day. This means an 8-pound baby may drink about 20 ounces of formula a day; however, this is just an estimate. The most important thing is to pay attention to your baby s cues.  If your baby is always fussy, needs more iron or has certain food allergies, your physician may suggest you change your baby s formula to a different kind.     How do I warm my baby s bottles?  You may feed your baby a bottle without warming it first. It is OK for the breast milk or formula to be cool or room temperature. If your baby seems to prefer it warmed, you can put the filled bottle in a container of warm water and let it stand for a few minutes. Check the temperature of the liquid on your skin before feeding it to your baby; to be sure it isn t too hot. Do not heat bottles in the microwave. Microwaves heat food and liquids unevenly, and  this can cause hot spots that can burn your baby.    How do I clean and sterilize bottles?  Sterilize bottles and nipples before you use them for the first time. You can do this by putting them in boiling water for 5 minutes. After that first time, you can wash them in hot and soapy water. Rinse them carefully to be sure there is no soap left on them. You can also wash them in the .    [facility] [phone] ([phone])    Baby Café    Pregnant and interested in breastfeeding?  Need answers to breastfeeding questions?  Want to help breastfeeding moms?  Already breastfeeding and want to meet other moms?    Join us at the Baby Café!    Baby Cafe is a free, drop-in service offering breast-feeding support for pregnant women, breast-feeding mothers and their families.  Come share tips and socialize with other mothers.  Babies and siblings are welcome (no childcare available).    Starting April 2018, Baby Café will be at 4 locations.  Please see below for the Baby Café closest to you!      [facility]  [address] [place] [address]  1st Wednesday: 10am-12pm    [facility]  [address] [place] [address]  3rd Wednesday 4-6pm    [facility]  [address] [place] [address]  4th Wednesday 10am-12:30pm    [facility] [employer] [facility]  [address] [place] [address]  4th Wednesdays: 4-6pm      Hmong, Montserratian, and Belgian which is may be available at some sites.    For more information, please contact: [email or web address] [email or web address]. or [phone]

## 2021-06-05 NOTE — PROGRESS NOTES
Here alone today. Feeling well and offers no concerns. Notes some insomnia and difficulty reinitiating sleep. Will work from home after next week. Reviewed normal labs from last visit. Reviewed warning s/sx and reasons to call. RTC 1 week.

## 2021-06-05 NOTE — PATIENT INSTRUCTIONS - HE
[facility] Nurse Midwives - Contact information:  Appointment line and to get a hold of CNM in clinic Monday-Friday 8 am - 5 pm:  [phone].  There are some clinics with early start times (1st appointment 7:40 am) and others with evening hours (last appointment 6:20 pm).  Most are typically open from 8 am to 5 pm.    CNM on call answering service: [phone].  Specify your hospital of choice and leave a brief message for CNM;  will then page CNM who is on call at your specified hospital and you should receive a call back with 15 minutes.  Be sure that your ringer is audible and that you can accept blocked calls so that we can get back in touch with you! This number should be reserved for urgent needs if during the day, before 8 am, after 5 pm, weekends, holidays.    Contact the on-call CNM with warning signs, such as:    vaginal bleeding     Vaginal discharge and itching or pain and burning during urination    Leg/calf pain or swelling on one side    severe abdominal pain    nausea and vomiting more than 4-5 times a day, or if you are unable to keep anything down    fever more than 100.4 degrees F.          You are invited to  Meet the [facility] Nurse-Midwives  A way to tour the hospital Labor and Delivery unit and meet the midwives that attend births since you may not have the opportunity to meet them during your prenatal care.  Some sessions are informal meet and greet type social hours, others address a specific concern or topic.    Tuesday, Februrary 12, 2019 7-8pm  [facility]    Wednesday, April 17, 2019 7-8pm  [facility]    Thursday, August 15, 2019 7-8pm  [facility]    Wednesday November 13, 2019 7-8 pm  [facility]    Please call [phone] to register        Touring the Maternity Care Center  To schedule a tour at either [place] or [facility], please do so online using  the following links:  [place] - [email or web address]  [facility] - [email or web address]      Car Seat Clinics:  https://dps.[place].gov/divisions/ots/child-passenger-safety/Pages/car-seat-checks.aspx  [facility]    Free Car Seat Distribution Facilities     By Appt. Address Contact Information (For appointment)      \Yes [employer]\[address]\[place],\cell Nicole [email or web address]      Yes [facility]\ [facility]\[place],\[place] Massiel Bentley)[email or web address]      Yes [facility]\[place],\cell Selena Flores)[phone]\[email or web address]      Immunizations:  http://www.cdc.gov/vaccines/schedules/easy-to-read/child.html      Postpartum Depression  The first weeks of caring for a  baby are more than a full-time job. Although it is often a happy time, your feelings and moods may not be what you expected. Many women experience  baby blues.  Here are some tips to help you understand when feelings of sadness are normal, and when you should call your health care provider.    What are the baby blues?  As many as 3 of every 4 women will have short periods of mood swings, crying, or feeling cranky or restless during the first weeks after birth. These feelings can be worse when you are tired or anxious. Women who have the baby blues often say they feel like crying but don t know why. Baby blues usually happen in the first or second week postpartum (after you give birth) and last less than a week. If you are not sleeping, becoming more upset, don t feel like you can take care of your baby, or your sadness lasts 2 weeks or more, call your health care provider.    What is postpartum  depression?  About one in every 5 women will develop depression during the first few months postpartum that may be mild, moderate, or severe. Women who have postpartum depression may have some of these symptoms:    Feeling guilty     Not able to enjoy your baby and feeling like you are not bonding with your baby      Not able to sleep, even when the baby is sleeping    Sleeping too much and feeling too tired to get out of bed    Feeling overwhelmed and not able to do what you need to during the day    Not able to concentrate    Don t feel like eating    Feeling like you are not normal or not yourself anymore    Not able to make decisions    Feeling like a failure as a mother    Feeling lonely or all alone    Thinking your baby might be better off without you  If you have any of these symptoms, call your health care provider!    Which symptoms of postpartum depression are dangerous?  Sometimes a woman with postpartum depression will have thoughts of harming herself or her baby. If you find yourself thinking about hurting yourself or your baby, call your health care provider immediately.    MOTHERHOOD: THE EARLY DAYS  You prepare for the birth of your baby for many months during pregnancy, and then the first months at home after your baby is born can be a quiet, gentle time of getting to know this new person who has come to live in your home. But for most women it is not all quiet or sweet. And for some women it is a very hard time.  What Can I Expect in the First Few Months After the Baby Comes?  New mothers and their families face many challenges in the first few months:    Your body and your hormones have to get back to normal.    You and the baby will be learning to breastfeed.    Babies only sleep a few hours at a time. The entire family will have a hard time getting enough sleep.    You and your family need to learn how to parent this new family member.    If you have a partner, you have to figure out how to  stay together as a couple and maybe even start to have sex again.    You may have to figure out how to keep from getting pregnant again right away.    You may need to return to work and find day care.    How Long Will it Take for My Body to Get Back to Normal?  Some changes will occur quickly. Others will not occur as quickly.    Your uterus, cervix, and vagina will all shrink to their nonpregnant size in about 2 weeks. Your vagina may be tender and dry for a few months--especially if you are breastfeeding.    If you had stitches or hemorrhoids, your   bottom   will be sore for 2 weeks or more.    For some women who have problems urinating, it can take several months for you to be able to hold your urine when you cough or sneeze or suddenly  something heavy.    Your breast milk will   come in   2 to 3 days after the birth of your baby. It will take 6 to 8 weeks for you and the baby to get the hang of breastfeeding and find a pattern. During these first weeks, you can have engorged breasts at times and often leak milk.    Your stomach and intestines all have to fall back into place. You may have a lot of gas for a few weeks.    You may be constipated--especially if you are breastfeeding.    Your stretched stomach muscles can recover in a few weeks, but for some women it takes longer--6 months or a year--to recover.    If you had a  delivery, you may have pain or numbness around the incision for 6 months or more.    Losing the weight you gained during pregnancy will probably take 6 months to a year. Have patience! It took 40 weeks to get here. Give yourself 40 weeks to get back.    What Can I Expect When My Hormones Change?  About 75% of all women will get the   blues.   This usually starts about 3 days after the birth of your baby. You may cry easily and feel very, very tired. A few women become very depressed. If you had a  delivery or your new baby was sick, you are at a higher risk for  depression.  Call your health care provider right away if you cannot care for yourself or your baby, if you feel very nervous or worried, if you cannot stop crying, or if you are having thoughts of hurting yourself or your baby.    Taking Care of Yourself  While you are still pregnant:    Talk with your partner and your family about the time ahead. Arrange for someone to help you during the first weeks at home if you can.    Talk with your health care provider about birth control options and make a plan before the baby comes.    If you are worried about how to parent a , take parenting classes. You will learn a lot about how babies act and you will make some friends who are going through the same thing at the same time. Most [facility] have these classes.    Arrange for someone to help with baby care if you can.  After the baby comes:    Ask for help. Let other people do the cooking and cleaning and run the house. Focus on yourself and your baby.    Sleep whenever you can. Try not to be tempted to   get some things done   when the baby sleeps. This is your time to sleep, too.    Drink lots of water. You will need at least 6 big glasses of water everyday to avoid constipation and make enough breast milk. Every time you sit down to breastfeed, have a big glass of water with you to drink while you are nursing.    Eat lots of vegetables and fruit. You will need lots of vitamins and fiber to help your body get back to normal. This will also help you avoid constipation.    Go outside and walk. Babies can go outside even if it is very cold. Fresh air and sunshine will do you both good.    Take sitz baths. Put about 6 inches of warm water in your bathtub and sit in there for 15 minutes 2 to 3 times a day. This will help your   bottom   heal more quickly. It will also give you 15 minutes of private time!    Talk to other mothers. Join a new parents group. Call Amber and go to [facility] meetings if you are  breastfeeding.     With your partner:    Keep talking. Share the experience.    Spend time alone. Even a 30-minute walk can be a date.    Start a birth control method. You can get pregnant before you even have a period. It is very important to use birth control if you do not want to get pregnant again right away.    When you have sex, use a lubricant. A lot of lubricant! Take it slow.  The first few months after a baby comes can be a lot like floating in a jar of honey--very sweet and spence, but very sticky, too. Take time to enjoy the good parts. Remind yourself that this time will pass. Bon voyage!    FOR MORE INFORMATION  For questions about depression during and after pregnancy:  http://www.womenshealth.gov/publications/our-publications/fact-sheet/depression-pregnancy.html   After birth: The first 6 weeks:  http://www.[employer]/Post-Birth-and-Recovery   Breastfeeding resources:  http://www.[employer].org/health-info/getting-breastfeeding-off-to-a-good-start/    HEALTHY PREGNANCY CARE: 37 to 41 WEEKS PREGNANT    Talk with your midwife or physician about when to call with signs of labor    Regular uterine contractions that are getting closer together and/or stronger    If you think your water has broken or is leaking    Bleeding from the vagina like a period (bloody vaginal discharge is normal)    If you are not feeling your baby move    Make plans for transportation and  as needed for when you are going to the hospital.    Your midwife or physician may offer to check your cervix for changes.     Ask your health care provider about vaccinations you may need following delivery. By now, you should have received a Tdap immunization to protect against pertussis or whooping cough. Fathers and family members who will be in close contact with the baby should also receive a Tdap shot at least two weeks before the expected birth of the baby if they have not had a Td (tetanus) shot for at least  two years.    If you are past your due date, discuss the next steps leading to delivery with your midwife or physician. If you don't start labor on your own by 41 or 42 weeks, your midwife or physician may recommend giving you medicines to ripen your cervix and start labor.    Preparing for your baby: Tell your midwife or physician how you plan to feed your baby (breast or bottle), who you have chosen to do pediatric care for your baby, and if you have a boy, whether you have chosen to have him circumcised. You will need a car seat correctly installed in your vehicle to bring your baby home. As you start to set up the nursery at home for your baby, make sure the crib is safe. The mattress needs to fit snugly against the edges of the crib. If you can fit a soda can between the bars, they are too far apart and can allow the baby's head to caught between them.    Learn about infant care and feeding, including information about infant CPR. We recommend that you put your baby to sleep on his or her back to reduce the chance of Sudden Infant Death Syndrome (SIDS). To maintain a healthy environment in which your child can grow, it's best to keep your home smoke-free. By preparing ahead, your transition into parenthood will go smoothly for you and your baby.    Your midwife or physician will want to see you for a checkup 2 to 6 weeks after delivery.    If you have questions about any symptoms you are experiencing or any other concerns, call your provider or their clinic staff at [facility] at Phone: [phone]. If it's after clinic hours, physician patients should call the [facility] at [phone] ([phone]); midwife patients should call their answering service at [phone].    How can you care for yourself at home?   You can refer to the Starting Out Right book or find it online at http://www.[facility].org/images/stories/maternity/[facility]-Starting-Out-Right.pdf or  http://www.[facility].org/images/stories/flipbooks/[facility]-starting-out-right/[facility]-starting-out-right.html#p=8     You can sign up for a weekly parenting e-mail that gives support, tips and advice from health care professionals that starts with pregnancy and continues through the toddler years. To register, go to [email or web address] at any time during your pregnancy.        Making Plans for Feeding My Baby    By this point, you probably have read a lot about feeding your baby.  Breastfeed or formula? Each mother s decision is her own and [facility] respects you and your choices. We ve gathered information on both breastfeeding and formula feeding to help with your decision. Talking with your physician or nurse-midwife can also help in your decision.  However you plan to feed your baby, [facility]aternity Care Centers encourage rooming in with your baby, skin-to-skin contact and feeding your baby based on his or her cues.    Skin-to-skin contact  Being close to mom helps your baby adjust to life outside of the womb.  It helps your baby regulate their body temperature, heart rate, and breathing.  Your baby will usually be placed skin-to-skin immediately following birth or as soon as possible, if medical intervention is needed.    Rooming-In  Having your baby stay with you in your room is called  rooming-in .  Keeping your baby in your room helps you to learn how to care for your baby by getting to know your baby s cues, body rhythms and sleep cycle.       Cue-based feeding  Cues (signals) are baby s way of telling you what he or she wants.  When you learn your infant s cues, you know how to care for and feed your baby.   Feeding cues are the licking and smacking of lips, bringing their fist to their mouth, and a reflex called  rooting - where baby turns and opens his or her mouth, searching for the breast or bottle.  Crying is a late feeding cue.  Babies can feed frequently, often at least 8 times in 24  hours.  Breastfeeding facts  Breast milk is the best source of nutrition for your baby and is available at birth. In the first couple of days, your milk volume is already starting to increase, though it may not be noticeable. Breastfeed frequently to increase your milk supply. Within three to five days, you will begin to notice larger milk volumes. An increase in breast size, heaviness and firmness are often described as the milk  coming in.  Frequent breastfeeding can help breasts from getting overly firm and painful. You will know the baby is getting enough milk if your baby is having wet and dirty diapers and gaining weight.     If your goal is to exclusively breastfeed, it is important to not use any formula or artificial nipples (including bottles and pacifiers) while your baby is learning to breastfeed.  While it may seem like an  easy  option to give your baby a bottle, formula should only be given if there is a medical reason for your baby to have it.    Positioning and attachment   Get comfortable.  Use pillows as needed to support your arms and baby.  Hold baby close at the level of your breast, facing you in a tummy to tummy position.  Skin to skin helps with this.  Position the baby with his or her nose by the nipple.  There should be a straight line from baby s ear to shoulder to hips.  Tickle your baby s lips or wait for baby to open mouth wide, bring baby to breast by leading with the chin.  Aim the nipple at the roof of baby s mouth.  A rapid sucking pattern is followed by longer, drawing pattern with occasional swallows heard.  When baby is correctly latched, your nipple and much of the areola are pulled well into baby s mouth.      Returning to work or school  Focus on a good start to breastfeeding.  Many women continue to provide breastmilk for their baby when they return to work or school.  Making plans about where to pump and store milk can make the transition go well.  Talk with other mothers  who have also returned to work or school for tips and support.  Your employer s Human Resource department may be a resource as well.     Returning to work or school: (continued)     babies can mean fewer  sick  days for you.    A quality breast pump will also save time and add comfort.  Check with your insurance prior to giving birth for breast pump coverage.  Many insurance companies include a pump within your benefits.    Wait until your baby is at least three weeks old to introduce a bottle for the first time.  Have someone besides you give the bottle.    Breastfeed when you are with your baby. Reserve your bottles of breast milk for when you are away.     Your breasts will need to be  emptied  either by your baby or a pump.  Plan to pump at least twice in an eight hour day.    If you cannot pump at work, continue breastfeeding at home. Any amount of breast milk is worth giving to your baby.    Formula feeding facts  If you are planning to use formula to feed your baby, you will want to make some preparations ahead of time. Talk to your doctor or nurse-midwife about what type of formula to use. Some are iron-fortified, meaning they have extra iron in them. You will want to purchase formula and bottles before your baby is born to be sure you are ready after you return from the hospital. The [facility] do not provide formula samples to take home.    Be sure to follow formula mixing directions closely. Regular milk in the dairy case at the grocery store should not be given to babies under 1 year old. Baby formula is sold in several forms including:    Ready-to-use. This is the most expensive, but no mixing is necessary.    Concentrated liquid. This is less expensive than ready-to-use and you mix with water.    Powder. This is the least expensive. You mix one level scoop of powdered formula with two ounces of water and stir well.    Most babies need 2.5 ounces of formula per pound of body weight  each day. This means an 8-pound baby may drink about 20 ounces of formula a day; however, this is just an estimate. The most important thing is to pay attention to your baby s cues.  If your baby is always fussy, needs more iron or has certain food allergies, your physician may suggest you change your baby s formula to a different kind.     How do I warm my baby s bottles?  You may feed your baby a bottle without warming it first. It is OK for the breast milk or formula to be cool or room temperature. If your baby seems to prefer it warmed, you can put the filled bottle in a container of warm water and let it stand for a few minutes. Check the temperature of the liquid on your skin before feeding it to your baby; to be sure it isn t too hot. Do not heat bottles in the microwave. Microwaves heat food and liquids unevenly, and this can cause hot spots that can burn your baby.    How do I clean and sterilize bottles?  Sterilize bottles and nipples before you use them for the first time. You can do this by putting them in boiling water for 5 minutes. After that first time, you can wash them in hot and soapy water. Rinse them carefully to be sure there is no soap left on them. You can also wash them in the .    [facility] [phone] ([phone])    Share with Women\[place] I in Labor?  What is labor? Labor is the work that your body does to birth your baby. Your uterus (the womb) contracts(tightens). The contractions(labor pains) push your baby down onto your cervix(the opening ofyour uterus). Thispressure causesyour cervix to open. When your cervix iscompletely open (10 centimeters dilated), you will push your baby through your vagina and out into the world.  What do contractions feel like? When contractionsfirst start, theyusually feellike cramps duringyour period. Sometimesyoufeelpain in your back. Mostoften,contractions feel like muscles pulling painfully in your lower belly. At first, the contractions will  probably be 15 to 20 minutes apart.They maybe irregular and will not feel too painful. As labor goes on, the contractionsget stronger,closer together, more consistent, and more painful.  How do I time the contractions? When the contractionsseem to be coming regularly, youshould start to time them.You time your contractions by counting the number of minutes from the start of one contraction to the start of the next contraction.  What should I do during early labor when the contractions start? If it is night andyoucan sleep, do so. If it happensduring the day, there are some things you can do to take care of yourself at home: Walk. If the painsyou are having are reallabor, walking will makethecontractionscome closer together and they will be stronger,but you will be able to cope with them better if you are standing or moving around. If the contractions are early labor ones that come andgo (sometimes called false labor), walkingcan make them go away. Take a shower or bath. This will help you relax. Eat. Labor is a big event.Your body needs a lot of energy to be effective.Eat whatever you feel like eating. Drink water. Not drinking enough water can cause contractions to not be as effectiveas theyshould be.You need to be well hydrated (drinking enoughwater) to help your body work well during labor. Take a na p. If youfeel tired, lay down on your side and get all the rest you can. It helps to be rested when you go intoactive labor. Do something you enjoy. Spend time with family. Watch a movie. Distraction will help you relax. Get a massage. If your labor is in your back, a strong massage on your lower back may feel very good. Getting a foot massage or having a partner rub your feet can also be very relaxing. Don t panic. You can do this. Your body was made for this. You are strong!  When should I call my health care provider if I think I am in labor? Your contractions have been 5 minutes or less apart for at least an  hour. Your contractions are becoming so painful youcannot walk or talk during one. You think your amniotic sac (bag of cheung) breaks. You may have a big gush of amniotic fluid (water) or just fluid that runs down your legs when you walk or move or change position.  Are there other reasons to call my health care provider? If you are concerned about anything, don t hesitate to call your health care provider.You should definitely call your health care provider or go to the hospital if:  It is 3 weeks or more before your due date, and you are having contractions.  You have vaginal bleeding that is more than your period, soaks your underwear, or runs down your legs.  You have sudden severe pain that does not go away with rest.  Your baby has not movedfor several hours.  You are leaking greenish fluid.    For More Information: http://onlinelibrary.verde.com/doi/10.1111/jmwh.65677/epdf     [place] Department of Health and Human Services: Signs oflabor,labor stages, and types of birth  http://womenshealth.gov/pregnancy/childbirth-beyond/labor-birth.html#a      Childbirth and Parenting Education:   [place] parenting center: http://[employer]/   [phone]  [place]: (education, yoga & wellness) www.DDN  Enlightened Mama: www.enlightenedYoungCurrent.Bumpr   Childbirth collective: (Parent topic nights)  www.childbirthcollective.org/  Hypnobabies:  www.hypnobabiestwincities.com/  Hypnobirthing:  Http://hypnobirthing.com/    Book Recommendations:   Rach [place]'s Birthing From Within--first few chapters include a new-age tone, you may prefer to skip it and keep going, because there is good stuff later.  This book recommendation covers emotional preparation, but does cover coping with pain, and use of both pharmacological and nonpharmacological methods.    Dr. Nielsen' The Pregnancy Book and The Birth Book--the pregnancy book goes month-by month    Womanly Art of Breastfeeding by La Leche League International   Bestfeeding by  "Nicolette Smith--great pictures    Mothering Your Nursing Toddler, by Noemy Chavez.   Addresses dealing with so many of the challenging behaviors of a nursing toddler.  How Weaning Happens, by La Leche League.  Discusses weaning at all ages, from medically necessary weaning of an infant, all the way up to age 5 (or older), with why/why not, and strategies.  Very empowering book both for deciding to wean and deciding not to.    American College of Nurse-Midwives (ACNM) http://www.midwife.org/; look at the informational handouts at http://www.midwife.org/Share-With-Women     www.mymidwife.org    Mother to Baby (Medication and Herbal guidance in pregnancy): http://www.mothertobaby.org  Toll-Free Hotline: 158.724.3908  LactMed (Medication use while breastfeeding): http://toxnet.nlm.nih.gov/newtoxnet/lactmed.htm    Women's Health.gov:  http://www.womenshealth.gov/a-z-topics/index.html    American pregnancy association - http://americanpregnancy.org    Centering Pregnancy (group prenatal care option): http://centeringhealthcare.org    Information about doulas:  Childbirth collective: http://www.childbirthcollective.org/  Doulas of [place] ([place]ERT[place]):  www.norberto.org  [facility]  project: http://twincitiesdoulaproject.com/     Early Childhood and Family Education (ECFE):  ECFE offers parents hands-on learning experiences that will nourish a lifetime of teachable moments.  http://ecfe.info/ecfe-home/    March of Dimes www.marchgarbsdiiKoa.com     FDA - Nutrition  www.mypyramid.gov  Under \"For Consumers,\" click on \"pregnant and breastfeeding women.\"      Centers for Disease Control and Prevention (CDC) - Vaccines : http://www.cdc.gov/vaccines/       When researching information on the web, question the validity of websites.  The domains .gov, .edu and.org tend to be more reliable information.  If there are a lot of advertisements, be cautious of the information provided. Stay away from blogs and chat rooms please!   "

## 2021-06-05 NOTE — PATIENT INSTRUCTIONS - HE
[facility] Nurse Midwives - Contact information:  Appointment line and to get a hold of CNM in clinic Monday-Friday 8 am - 5 pm:  [phone].  There are some clinics with early start times (1st appointment 7:40 am) and others with evening hours (last appointment 6:20 pm).  Most are typically open from 8 am to 5 pm.    CNM on call answering service: [phone].  Specify your hospital of choice and leave a brief message for CNM;  will then page CNM who is on call at your specified hospital and you should receive a call back with 15 minutes.  Be sure that your ringer is audible and that you can accept blocked calls so that we can get back in touch with you! This number should be reserved for urgent needs if during the day, before 8 am, after 5 pm, weekends, holidays.    Contact the on-call CNM with warning signs, such as:    vaginal bleeding     Vaginal discharge and itching or pain and burning during urination    Leg/calf pain or swelling on one side    severe abdominal pain    nausea and vomiting more than 4-5 times a day, or if you are unable to keep anything down    fever more than 100.4 degrees F.         You are invited to  Meet the [facility] Nurse-Midwives  A way to tour the hospital Labor and Delivery unit and meet the midwives that attend births since you may not have the opportunity to meet them during your prenatal care.  Some sessions are informal meet and greet type social hours, others address a specific concern or topic.    Thursday, Februrary 22, 2018 7-8pm  [facility]  Social Hour    Thursday, April 19, 2018 7-8pm  [facility]  Exercise, nutrition and weight gain    Tuesday, June 28, 2018 7-8pm  [facility]  Postpartum depression/anxiety    Thursday August 23, 2018 7-8 pm  [facility]  Physiology of birth and breastfeeding, Pediatrician presentation    Thursday October 18,  2018  7-8pm,  [facility]  Social Hour    Please call [phone] to register        Touring the Maternity Care Center  To schedule a tour at either [facility] or [facility], please do so online using the following links:  [facility] - [email or web address]  [facility] - [email or web address]&[email or web address]    Childbirth and Parenting Education:   [facility]: http://[employer]et[employer]+[facility] Center[facility]ellar [employer]ical/   [phone]  [place]: (education, yoga & wellness) www.RxVault.in  Enlightened Mama: www.MyCrowdenedmama.[employer]   Childbirth collective: (Parent topic nights)  www.childbirthcollective.org/  Hypnobabies:  www.hypnobabiestwincities.com/  Hypnobirthing:  Http://hypnobirthing.com/    Breastfeeding Information:  An excellent 15-minute video on preparing for a good breastfeeding experience, including how to ensure you have a good milk supply and a comfortable latch, can be found here:  https://A Little Easier Recovery.[employer]/      Book Recommendations:   Rach Janet's Birthing From [facility]--first few chapters include a new-age tone, you may prefer to skip it and keep going, because there is good stuff later.  This book recommendation covers emotional preparation, but does cover coping with pain, and use of both pharmacological and nonpharmacological methods.    Dr. Nielesn' The Pregnancy Book and The Birth Book--the pregnancy book goes month-by month    Womanly Art of Breastfeeding by La Leche League International   Bestfeeding by Nicolette Smith--great pictures    Mothering Your Nursing Toddler, by Noemy Chavez.   Addresses dealing with so many of the challenging behaviors of a nursing toddler.  How Weaning Happens, by La Leche League.  Discusses weaning at all ages, from medically necessary weaning of an infant, all the way up to age 5 (or  "older), with why/why not, and strategies.  Very empowering book both for deciding to wean and deciding not to.    American College of Nurse-Midwives (ACNM) http://www.midwife.org/; look at the informational handouts at http://www.midwife.org/Share-With-Women     www.mymidwife.org    Mother to Baby (Medication and Herbal guidance in pregnancy): http://www.mothertobaby.org  Toll-Free Hotline: 693.162.7498  LactMed (Medication use while breastfeeding): http://toxnet.nlm.nih.gov/newtoxnet/lactmed.htm    Women's Health.gov:  http://www.womenshealth.gov/a-z-topics/index.html    American pregnancy association - http://americanpregnancy.org    Centering Pregnancy (group prenatal care option): http://centeringhealthcare.org    Information about doulas:  Childbirth collective: http://www.childbirthcollective.org/  Doulas of [place] ([place]):  www.norberto.org  [employer] [facility]  project: http://TapnScrapcitiesdoulaproject.com/     Early Childhood and Family Education (ECFE):  ECFE offers parents hands-on learning experiences that will nourish a lifetime of teachable moments.  http://ecfe.info/ecfe-home/     Dimes www.MyForce.Ludium Lab     FDA - Nutrition  www.mypyramid.gov  Under \"For Consumers,\" click on \"pregnant and breastfeeding women.\"      Centers for Disease Control and Prevention (CDC) - Vaccines : http://www.cdc.gov/vaccines/       When researching information on the web, question the validity of websites.  The domains .gov, .edu and.org tend to be more reliable information.  If there are a lot of advertisements, be cautious of the information provided. Stay away from blogs and chat rooms please!      Screening Program  Http://www.health.[facility].[place].us/newbornscreening/  [place] newborns are tested soon after birth for more than 50 hidden, rare disorders, including hearing loss and critical congenital heart disease (CCHD). This site provides resources and information for families and providers.    HEALTHY " PREGNANCY CARE: 37 to 41 WEEKS PREGNANT    Talk with your midwife or physician about when to call with signs of labor    Regular uterine contractions that are getting closer together and/or stronger    If you think your water has broken or is leaking    Bleeding from the vagina like a period (bloody vaginal discharge is normal)    If you are not feeling your baby move    Make plans for transportation and  as needed for when you are going to the hospital.    Your midwife or physician may offer to check your cervix for changes.     Ask your health care provider about vaccinations you may need following delivery. By now, you should have received a Tdap immunization to protect against pertussis or whooping cough. Fathers and family members who will be in close contact with the baby should also receive a Tdap shot at least two weeks before the expected birth of the baby if they have not had a Td (tetanus) shot for at least two years.    If you are past your due date, discuss the next steps leading to delivery with your midwife or physician. If you don't start labor on your own by 41 or 42 weeks, your midwife or physician may recommend giving you medicines to ripen your cervix and start labor.  Induction of labor: http://onlinelibrary.verde.com/store/10.1016/j.jmwh.2008.04.018/asset/j.jmwh.2008.04.018.pdf?v=1&t=ztlg0uup&b=[email or web address]    Preparing for your baby: Tell your midwife or physician how you plan to feed your baby (breast or bottle), who you have chosen to do pediatric care for your baby, and if you have a boy, whether you have chosen to have him circumcised. You will need a car seat correctly installed in your vehicle to bring your baby home. As you start to set up the nursery at home for your baby, make sure the crib is safe. The mattress needs to fit snugly against the edges of the crib. If you can fit a soda can between the bars, they are too far apart and can allow the  baby's head to caught between them.    Learn about infant care and feeding, including information about infant CPR. We recommend that you put your baby to sleep on his or her back to reduce the chance of Sudden Infant Death Syndrome (SIDS). To maintain a healthy environment in which your child can grow, it's best to keep your home smoke-free. By preparing ahead, your transition into parenthood will go smoothly for you and your baby.    Your midwife or physician will want to see you for a checkup 2 to 6 weeks after delivery.    If you have questions about any symptoms you are experiencing or any other concerns, call your provider or their clinic staff at [facility] at Phone: [phone]. If it's after clinic hours, physician patients should call the Care Connection at [phone] ([phone]); midwife patients should call their answering service at [phone].    How can you care for yourself at home?   You can refer to the Starting Out Right book or find it online at http://www.healtheast.org/images/stories/maternity/[facility]-Starting-Out-Right.pdf or http://www.[facility].org/images/stories/flipbooks/[facility]-starting-out-right/[facility]-starting-out-right.html#p=8     You can sign up for a weekly parenting e-mail that gives support, tips and advice from health care professionals that starts with pregnancy and continues through the toddler years. To register, go to www.[facility].org/baby at any time during your pregnancy.        Making Plans for Feeding My Baby    By this point, you probably have read a lot about feeding your baby.  Breastfeed or formula? Each mother s decision is her own and [facility] respects you and your choices. We ve gathered information on both breastfeeding and formula feeding to help with your decision. Talking with your physician or nurse-midwife can also help in your decision.  However you plan to feed your baby, [facility]aternity Care Centers encourage  rooming in with your baby, skin-to-skin contact and feeding your baby based on his or her cues.    Skin-to-skin contact  Being close to mom helps your baby adjust to life outside of the womb.  It helps your baby regulate their body temperature, heart rate, and breathing.  Your baby will usually be placed skin-to-skin immediately following birth or as soon as possible, if medical intervention is needed.    Rooming-In  Having your baby stay with you in your room is called  rooming-in .  Keeping your baby in your room helps you to learn how to care for your baby by getting to know your baby s cues, body rhythms and sleep cycle.       Cue-based feeding  Cues (signals) are baby s way of telling you what he or she wants.  When you learn your infant s cues, you know how to care for and feed your baby.   Feeding cues are the licking and smacking of lips, bringing their fist to their mouth, and a reflex called  rooting - where baby turns and opens his or her mouth, searching for the breast or bottle.  Crying is a late feeding cue.  Babies can feed frequently, often at least 8 times in 24 hours.    Breastfeeding facts  Breast milk is the best source of nutrition for your baby and is available at birth. In the first couple of days, your milk volume is already starting to increase, though it may not be noticeable. Breastfeed frequently to increase your milk supply. Within three to five days, you will begin to notice larger milk volumes. An increase in breast size, heaviness and firmness are often described as the milk  coming in.  Frequent breastfeeding can help breasts from getting overly firm and painful. You will know the baby is getting enough milk if your baby is having wet and dirty diapers and gaining weight.     If your goal is to exclusively breastfeed, it is important to not use any formula or artificial nipples (including bottles and pacifiers) while your baby is learning to breastfeed.  While it may seem like an   easy  option to give your baby a bottle, formula should only be given if there is a medical reason for your baby to have it.      Positioning and attachment   Get comfortable.  Use pillows as needed to support your arms and baby.  Hold baby close at the level of your breast, facing you in a tummy to tummy position.  Skin to skin helps with this.  Position the baby with his or her nose by the nipple.  There should be a straight line from baby s ear to shoulder to hips.  Tickle your baby s lips or wait for baby to open mouth wide, bring baby to breast by leading with the chin.  Aim the nipple at the roof of baby s mouth.  A rapid sucking pattern is followed by longer, drawing pattern with occasional swallows heard.  When baby is correctly latched, your nipple and much of the areola are pulled well into baby s mouth.      Returning to work or school  Focus on a good start to breastfeeding.  Many women continue to provide breastmilk for their baby when they return to work or school.  Making plans about where to pump and store milk can make the transition go well.  Talk with other mothers who have also returned to work or school for tips and support.  Your employer s Human Resource department may be a resource as well.     Returning to work or school: (continued)     babies can mean fewer  sick  days for you.    A quality breast pump will also save time and add comfort.  Check with your insurance prior to giving birth for breast pump coverage.  Many insurance companies include a pump within your benefits.    Wait until your baby is at least three weeks old to introduce a bottle for the first time.  Have someone besides you give the bottle.    Breastfeed when you are with your baby. Reserve your bottles of breast milk for when you are away.     Your breasts will need to be  emptied  either by your baby or a pump.  Plan to pump at least twice in an eight hour day.    If you cannot pump at work, continue  breastfeeding at home. Any amount of breast milk is worth giving to your baby.    Formula feeding facts  If you are planning to use formula to feed your baby, you will want to make some preparations ahead of time. Talk to your doctor or nurse-midwife about what type of formula to use. Some are iron-fortified, meaning they have extra iron in them. You will want to purchase formula and bottles before your baby is born to be sure you are ready after you return from the hospital. The [facility] do not provide formula samples to take home.    Be sure to follow formula mixing directions closely. Regular milk in the dairy case at the grocery store should not be given to babies under 1 year old. Baby formula is sold in several forms including:    Ready-to-use. This is the most expensive, but no mixing is necessary.    Concentrated liquid. This is less expensive than ready-to-use and you mix with water.    Powder. This is the least expensive. You mix one level scoop of powdered formula with two ounces of water and stir well.    Most babies need 2.5 ounces of formula per pound of body weight each day. This means an 8-pound baby may drink about 20 ounces of formula a day; however, this is just an estimate. The most important thing is to pay attention to your baby s cues.  If your baby is always fussy, needs more iron or has certain food allergies, your physician may suggest you change your baby s formula to a different kind.     How do I warm my baby s bottles?  You may feed your baby a bottle without warming it first. It is OK for the breast milk or formula to be cool or room temperature. If your baby seems to prefer it warmed, you can put the filled bottle in a container of warm water and let it stand for a few minutes. Check the temperature of the liquid on your skin before feeding it to your baby; to be sure it isn t too hot. Do not heat bottles in the microwave. Microwaves heat food and liquids unevenly, and  this can cause hot spots that can burn your baby.    How do I clean and sterilize bottles?  Sterilize bottles and nipples before you use them for the first time. You can do this by putting them in boiling water for 5 minutes. After that first time, you can wash them in hot and soapy water. Rinse them carefully to be sure there is no soap left on them. You can also wash them in the .    Care Connection [phone] ([phone])    Baby Café    Pregnant and interested in breastfeeding?  Need answers to breastfeeding questions?  Want to help breastfeeding moms?  Already breastfeeding and want to meet other moms?    Join us at the Baby Café!    Baby Cafe is a free, drop-in service offering breast-feeding support for pregnant women, breast-feeding mothers and their families.  Come share tips and socialize with other mothers.  Babies and siblings are welcome (no childcare available).    Starting April 2018, Baby Café will be at 4 locations.  Please see below for the Baby Café closest to you!      [facility]  [address] [place] [address]  1st Wednesday: 10am-12pm    [facility]  [address] [place] [address]  3rd Wednesday 4-6pm    [facility]  [address] [place] [address]  4th Wednesday 10am-12:30pm    [facility]  [address] [place] [address]  4th Wednesdays: 4-6pm      Hmong, Finnish, and Serbian which is may be available at some sites.    For more information, please contact: [email or web address]. or [phone]

## 2021-06-06 ENCOUNTER — HEALTH MAINTENANCE LETTER (OUTPATIENT)
Age: 41
End: 2021-06-06

## 2021-06-06 NOTE — TELEPHONE ENCOUNTER
"Pt never arrived at hospital following phone call last night.  CNM called patient, reports she became extremely uncomfortable while en route to the hospital and stopped at [facility][note 3[facility] where she was found to be 8cm.  She subsequently delivered a healthy 10#2oz baby boy \"Leodan\"  Reports everyone is doing well and she plans to follow up with the CNMs at 2 and 6 weeks postpartum.  "

## 2021-06-06 NOTE — PROGRESS NOTES
Mar Kaur is here for ERICK at 41 weeks and for  testing. Feeling well overall. Eager for baby to arrive. Still desires expectant management at this time but also wondering about natural methods to encourage labor. Discussed intercourse and membrane sweeping. She is open to a membrane sweep today and also on Friday if undelivered by then. Reviewed risks/benefits of this and completed although cervix high and patient's discomfort was somewhat limiting for provider. Reviewed that most women go into labor in the middle of the 41st week. Also discussed that we recommended IOL by 42 weeks to reduce maternal and fetal risks.    BPP completed and normal , vertex presentation.   NST reactive in clinic: 120 BL, moderate variability, accelerations present, and decelerations absent. [place]: rare contractions.    RTC Friday for repeat NST if no labor prior to then.

## 2021-06-06 NOTE — TELEPHONE ENCOUNTER
Attempted to call Mar to follow up on nipple pain.  No answer:  Left message acknowledging she had just been in for 2 week postpartum visit, and will try to reach her later this afternoon.  If unable to reach her then will call her when next in clinic next week.

## 2021-06-06 NOTE — PROGRESS NOTES
Here today with Brent. Feeling well and notes no s/sx of labor yet. Now on leave and flexible/open to start of labor. Feels she would like expectant management through 41 weeks then make an informed decision about continuing to await spontaneous labor or induction of labor based on that visit. Advised BPP prior to clinic visit and will have NST and exam at that time. Advised daily FMC and reviewed warning s/sx and reasons to call. RTC Tuesday at 41 weeks.

## 2021-06-06 NOTE — PATIENT INSTRUCTIONS - HE
[facility] Nurse Midwives - Contact information:  Appointment line and to get a hold of CNM in clinic Monday-Friday 8 am - 5 pm:  [phone].  There are some clinics with early start times (1st appointment 7:40 am) and others with evening hours (last appointment 6:20 pm).  Most are typically open from 8 am to 5 pm.    CNM on call answering service: [phone].  Specify your hospital of choice and leave a brief message for CNM;  will then page CNM who is on call at your specified hospital and you should receive a call back with 15 minutes.  Be sure that your ringer is audible and that you can accept blocked calls so that we can get back in touch with you! This number should be reserved for urgent needs if during the day, before 8 am, after 5 pm, weekends, holidays.    Contact the on-call CNM with warning signs, such as:    vaginal bleeding     Vaginal discharge and itching or pain and burning during urination    Leg/calf pain or swelling on one side    severe abdominal pain    nausea and vomiting more than 4-5 times a day, or if you are unable to keep anything down    fever more than 100.4 degrees F.         You are invited to  Meet the [facility] Nurse-Midwives  A way to tour the hospital Labor and Delivery unit and meet the midwives that attend births since you may not have the opportunity to meet them during your prenatal care.  Some sessions are informal meet and greet type social hours, others address a specific concern or topic.    Thursday, Februrary 22, 2018 7-8pm  [facility]  Social Hour    Thursday, April 19, 2018 7-8pm  [facility], [facility]  Exercise, nutrition and weight gain    Tuesday, June 28, 2018 7-8pm  [facility]  Postpartum depression/anxiety    Thursday August 23, 2018 7-8 pm  [facility], [facility]  Physiology of birth and breastfeeding, Pediatrician presentation    Thursday October 18,  2018  7-8pm,  [facility], [facility]  Social Hour    Please call [phone] to register        Touring the Maternity Care Center  To schedule a tour at either [facility] or [facility], please do so online using the following links:  [facility] - [email or web address]  [facility] - [email or web address]    Childbirth and Parenting Education:   [facility]: http://HungrioBayley Seton HospitalWordy/   [phone]  [place]: (education, yoga & wellness) www.Viewpoint  Enlightened Mama: www.Superbacenedmama.[employer]   Childbirth collective: (Parent topic nights)  www.childbirthcollective.org/  Hypnobabies:  www.hypnobabiestwincities.com/  Hypnobirthing:  Http://hypnobirthing.com/    Breastfeeding Information:  An excellent 15-minute video on preparing for a good breastfeeding experience, including how to ensure you have a good milk supply and a comfortable latch, can be found here:  https://Aquto.[employer]/      Book Recommendations:   Rach Janet's Birthing From [facility]--first few chapters include a new-age tone, you may prefer to skip it and keep going, because there is good stuff later.  This book recommendation covers emotional preparation, but does cover coping with pain, and use of both pharmacological and nonpharmacological methods.    Dr. Nielsen' The Pregnancy Book and The Birth Book--the pregnancy book goes month-by month    Womanly Art of Breastfeeding by La Leche League International   Bestfeeding by Nicolette Smith--great pictures    Mothering Your Nursing Toddler, by Noemy Chavez.   Addresses dealing with so many of the challenging behaviors of a nursing toddler.  How Weaning Happens, by La Leche League.  Discusses weaning at all ages, from medically necessary weaning of an infant, all the way up to age 5 (or  "older), with why/why not, and strategies.  Very empowering book both for deciding to wean and deciding not to.    American College of Nurse-Midwives (ACNM) http://www.midwife.org/; look at the informational handouts at http://www.midwife.org/Share-With-Women     www.mymidwife.org    Mother to Baby (Medication and Herbal guidance in pregnancy): http://www.mothertobaby.org  Toll-Free Hotline: 577.655.8884  LactMed (Medication use while breastfeeding): http://toxnet.nlm.nih.gov/newtoxnet/lactmed.htm    Women's Health.gov:  http://www.womenshealth.gov/a-z-topics/index.html    American pregnancy association - http://americanpregnancy.org    Centering Pregnancy (group prenatal care option): http://centeringhealthcare.org    Information about doulas:  Childbirth collective: http://www.childbirthcollective.org/  Doulas of [place] ([place]):  www.norberto.org  [facility]  project: http://Puuilocitiesdoulaproject.com/     Early Childhood and Family Education (ECFE):  ECFE offers parents hands-on learning experiences that will nourish a lifetime of teachable moments.  http://ecfe.info/ecfe-home/     [place] www.Purple.TAPT[employer] Networks     FDA - Nutrition  www.mypyramid.gov  Under \"For Consumers,\" click on \"pregnant and breastfeeding women.\"      Centers for Disease Control and Prevention (CDC) - Vaccines : http://www.cdc.gov/vaccines/       When researching information on the web, question the validity of websites.  The domains .gov, .edu and.org tend to be more reliable information.  If there are a lot of advertisements, be cautious of the information provided. Stay away from blogs and chat rooms please!      Screening Program  Http://www.health.[facility].[place].us/newbornscreening/  [place] newborns are tested soon after birth for more than 50 hidden, rare disorders, including hearing loss and critical congenital heart disease (CCHD). This site provides resources and information for families and providers.    HEALTHY " PREGNANCY CARE: 37 to 41 WEEKS PREGNANT    Talk with your midwife or physician about when to call with signs of labor    Regular uterine contractions that are getting closer together and/or stronger    If you think your water has broken or is leaking    Bleeding from the vagina like a period (bloody vaginal discharge is normal)    If you are not feeling your baby move    Make plans for transportation and  as needed for when you are going to the hospital.    Your midwife or physician may offer to check your cervix for changes.     Ask your health care provider about vaccinations you may need following delivery. By now, you should have received a Tdap immunization to protect against pertussis or whooping cough. Fathers and family members who will be in close contact with the baby should also receive a Tdap shot at least two weeks before the expected birth of the baby if they have not had a Td (tetanus) shot for at least two years.    If you are past your due date, discuss the next steps leading to delivery with your midwife or physician. If you don't start labor on your own by 41 or 42 weeks, your midwife or physician may recommend giving you medicines to ripen your cervix and start labor.  Induction of labor: http://onlinelibrary.verde.com/store/10.1016/j.jmwh.2008.04.018/asset/j.jmwh.2008.04.018.pdf?v=1&t=uzoj6wlz&m=[email or web address]    Preparing for your baby: Tell your midwife or physician how you plan to feed your baby (breast or bottle), who you have chosen to do pediatric care for your baby, and if you have a boy, whether you have chosen to have him circumcised. You will need a car seat correctly installed in your vehicle to bring your baby home. As you start to set up the nursery at home for your baby, make sure the crib is safe. The mattress needs to fit snugly against the edges of the crib. If you can fit a soda can between the bars, they are too far apart and can allow the  baby's head to caught between them.    Learn about infant care and feeding, including information about infant CPR. We recommend that you put your baby to sleep on his or her back to reduce the chance of Sudden Infant Death Syndrome (SIDS). To maintain a healthy environment in which your child can grow, it's best to keep your home smoke-free. By preparing ahead, your transition into parenthood will go smoothly for you and your baby.    Your midwife or physician will want to see you for a checkup 2 to 6 weeks after delivery.    If you have questions about any symptoms you are experiencing or any other concerns, call your provider or their clinic staff at [facility] at Phone: [phone]. If it's after clinic hours, physician patients should call the [facility] at [phone] ([phone]); midwife patients should call their answering service at [phone].    How can you care for yourself at home?   You can refer to the Starting Out Right book or find it online at http://www.healtheast.org/images/stories/maternity/[facility]-Starting-Out-Right.pdf or http://www.health[facility].org/images/stories/flipbooks/[facility]-starting-out-right/[facility]-starting-out-right.html#p=8     You can sign up for a weekly parenting e-mail that gives support, tips and advice from health care professionals that starts with pregnancy and continues through the toddler years. To register, go to www.health[facility].org/baby at any time during your pregnancy.        Making Plans for Feeding My Baby    By this point, you probably have read a lot about feeding your baby.  Breastfeed or formula? Each mother s decision is her own and [facility] respects you and your choices. We ve gathered information on both breastfeeding and formula feeding to help with your decision. Talking with your physician or nurse-midwife can also help in your decision.  However you plan to feed your baby, [facility] encourage  rooming in with your baby, skin-to-skin contact and feeding your baby based on his or her cues.    Skin-to-skin contact  Being close to mom helps your baby adjust to life outside of the womb.  It helps your baby regulate their body temperature, heart rate, and breathing.  Your baby will usually be placed skin-to-skin immediately following birth or as soon as possible, if medical intervention is needed.    Rooming-In  Having your baby stay with you in your room is called  rooming-in .  Keeping your baby in your room helps you to learn how to care for your baby by getting to know your baby s cues, body rhythms and sleep cycle.       Cue-based feeding  Cues (signals) are baby s way of telling you what he or she wants.  When you learn your infant s cues, you know how to care for and feed your baby.   Feeding cues are the licking and smacking of lips, bringing their fist to their mouth, and a reflex called  rooting - where baby turns and opens his or her mouth, searching for the breast or bottle.  Crying is a late feeding cue.  Babies can feed frequently, often at least 8 times in 24 hours.    Breastfeeding facts  Breast milk is the best source of nutrition for your baby and is available at birth. In the first couple of days, your milk volume is already starting to increase, though it may not be noticeable. Breastfeed frequently to increase your milk supply. Within three to five days, you will begin to notice larger milk volumes. An increase in breast size, heaviness and firmness are often described as the milk  coming in.  Frequent breastfeeding can help breasts from getting overly firm and painful. You will know the baby is getting enough milk if your baby is having wet and dirty diapers and gaining weight.     If your goal is to exclusively breastfeed, it is important to not use any formula or artificial nipples (including bottles and pacifiers) while your baby is learning to breastfeed.  While it may seem like an   easy  option to give your baby a bottle, formula should only be given if there is a medical reason for your baby to have it.      Positioning and attachment   Get comfortable.  Use pillows as needed to support your arms and baby.  Hold baby close at the level of your breast, facing you in a tummy to tummy position.  Skin to skin helps with this.  Position the baby with his or her nose by the nipple.  There should be a straight line from baby s ear to shoulder to hips.  Tickle your baby s lips or wait for baby to open mouth wide, bring baby to breast by leading with the chin.  Aim the nipple at the roof of baby s mouth.  A rapid sucking pattern is followed by longer, drawing pattern with occasional swallows heard.  When baby is correctly latched, your nipple and much of the areola are pulled well into baby s mouth.      Returning to work or school  Focus on a good start to breastfeeding.  Many women continue to provide breastmilk for their baby when they return to work or school.  Making plans about where to pump and store milk can make the transition go well.  Talk with other mothers who have also returned to work or school for tips and support.  Your employer s Human Resource department may be a resource as well.     Returning to work or school: (continued)     babies can mean fewer  sick  days for you.    A quality breast pump will also save time and add comfort.  Check with your insurance prior to giving birth for breast pump coverage.  Many insurance companies include a pump within your benefits.    Wait until your baby is at least three weeks old to introduce a bottle for the first time.  Have someone besides you give the bottle.    Breastfeed when you are with your baby. Reserve your bottles of breast milk for when you are away.     Your breasts will need to be  emptied  either by your baby or a pump.  Plan to pump at least twice in an eight hour day.    If you cannot pump at work, continue  breastfeeding at home. Any amount of breast milk is worth giving to your baby.    Formula feeding facts  If you are planning to use formula to feed your baby, you will want to make some preparations ahead of time. Talk to your doctor or nurse-midwife about what type of formula to use. Some are iron-fortified, meaning they have extra iron in them. You will want to purchase formula and bottles before your baby is born to be sure you are ready after you return from the hospital. The [facility] do not provide formula samples to take home.    Be sure to follow formula mixing directions closely. Regular milk in the dairy case at the grocery store should not be given to babies under 1 year old. Baby formula is sold in several forms including:    Ready-to-use. This is the most expensive, but no mixing is necessary.    Concentrated liquid. This is less expensive than ready-to-use and you mix with water.    Powder. This is the least expensive. You mix one level scoop of powdered formula with two ounces of water and stir well.    Most babies need 2.5 ounces of formula per pound of body weight each day. This means an 8-pound baby may drink about 20 ounces of formula a day; however, this is just an estimate. The most important thing is to pay attention to your baby s cues.  If your baby is always fussy, needs more iron or has certain food allergies, your physician may suggest you change your baby s formula to a different kind.     How do I warm my baby s bottles?  You may feed your baby a bottle without warming it first. It is OK for the breast milk or formula to be cool or room temperature. If your baby seems to prefer it warmed, you can put the filled bottle in a container of warm water and let it stand for a few minutes. Check the temperature of the liquid on your skin before feeding it to your baby; to be sure it isn t too hot. Do not heat bottles in the microwave. Microwaves heat food and liquids unevenly, and  this can cause hot spots that can burn your baby.    How do I clean and sterilize bottles?  Sterilize bottles and nipples before you use them for the first time. You can do this by putting them in boiling water for 5 minutes. After that first time, you can wash them in hot and soapy water. Rinse them carefully to be sure there is no soap left on them. You can also wash them in the .    [facility] [phone] ([phone])    Baby Café    Pregnant and interested in breastfeeding?  Need answers to breastfeeding questions?  Want to help breastfeeding moms?  Already breastfeeding and want to meet other moms?    Join us at the Baby Café!    Baby Cafe is a free, drop-in service offering breast-feeding support for pregnant women, breast-feeding mothers and their families.  Come share tips and socialize with other mothers.  Babies and siblings are welcome (no childcare available).    Starting April 2018, Baby Café will be at 4 locations.  Please see below for the Baby Café closest to you!      [facility]  [address] [place] [address]  1st Wednesday: 10am-12pm    [facility]  [address] [place] [address]  3rd Wednesday 4-6pm    [facility]  [address] [place] [address]  4th Wednesday 10am-12:30pm    [employer]  [address] [place] [address]  4th Wednesdays: 4-6pm      Hmong, Mosotho, and Belizean which is may be available at some sites.    For more information, please contact: [email or web address]. or [phone]

## 2021-06-06 NOTE — TELEPHONE ENCOUNTER
"Pt called CNM on call reporting ctx \"every 8-15 minutes\"  Describes as mild.  Wondering if labor might be starting.  Had membranes swept yesterday.  Has had some brown and then pink/red spotting since then.  Denies leaking fluid.  Denies amy bleeding.  Reports baby has been very active.  GBS negative.  Discussed option of evaluation at hospital vs expectant management.  Pt accepts expectant management.  Encouraged to try bath/shower, PO fluids, rest and continue to monitor.  Call if ctx become stronger/more patterned or prn.  Pt verbalizes understanding and agrees with plan.  "

## 2021-06-06 NOTE — TELEPHONE ENCOUNTER
Made second attempt to contact Mar--no answer.  Left message stating I will contact her early next week, but if she is not feeling better or needs breastfeeding advice/assistance before that time, can call, as we have IBCLCs available every day of the week but Sunday.

## 2021-06-06 NOTE — TELEPHONE ENCOUNTER
Made third attempt to follow up with Mar regarding nipple pain.  No answer.  Left message stating purpose of call, and inviting Mar to call back anytime today to speak with this writer if she has continuing concerns, or utilize MyChart.  Did explain that this writer will be out of the office for the next two weeks, but that other lactation consultants will be available if she has concerns to be addressed.

## 2021-06-06 NOTE — TELEPHONE ENCOUNTER
"Pt reports ctx have continued to increase in strength and frequency.  (See previous phone note)  Feeling ready to proceed to hospital.  Denies amy VB/LOF.  States baby has remained active.  Reports it will take \"at least an hour\" to find childcare and make her way to the hospital from [place].  [facility] L&D notified of impending pt arrival.  "

## 2021-06-06 NOTE — PATIENT INSTRUCTIONS - HE
[facility] Nurse Midwives - Contact information:  Appointment line and to get a hold of CNM in clinic Monday-Friday 8 am - 5 pm:  [phone].  There are some clinics with early start times (1st appointment 7:40 am) and others with evening hours (last appointment 6:20 pm).  Most are typically open from 8 am to 5 pm.    CNM on call answering service: [phone].  Specify your hospital of choice and leave a brief message for CNM;  will then page CNM who is on call at your specified hospital and you should receive a call back with 15 minutes.  Be sure that your ringer is audible and that you can accept blocked calls so that we can get back in touch with you! This number should be reserved for urgent needs if during the day, before 8 am, after 5 pm, weekends, holidays.    Contact the on-call CNM with warning signs, such as:    vaginal bleeding     Vaginal discharge and itching or pain and burning during urination    Leg/calf pain or swelling on one side    severe abdominal pain    nausea and vomiting more than 4-5 times a day, or if you are unable to keep anything down    fever more than 100.4 degrees F.         You are invited to  Meet the [facility] Nurse-Midwives  A way to tour the hospital Labor and Delivery unit and meet the midwives that attend births since you may not have the opportunity to meet them during your prenatal care.  Some sessions are informal meet and greet type social hours, others address a specific concern or topic.    Thursday, Februrary 22, 2018 7-8pm  [facility]  Social Hour    Thursday, April 19, 2018 7-8pm  [facility], [facility]  Exercise, nutrition and weight gain    Tuesday, June 28, 2018 7-8pm  [facility]  Postpartum depression/anxiety    Thursday August 23, 2018 7-8 pm  [facility], [facility]  Physiology of birth and breastfeeding, Pediatrician presentation    Thursday October 18,  2018  7-8pm,  [facility], [facility]  Social Hour    Please call [phone] to register        Touring the Maternity Care Center  To schedule a tour at either [place] or [facility], please do so online using the following links:  [facility] - [email or web address]&[email or web address]&[email or web address]&[email or web address]&it=1&cmy=368  [facility] - [email or web address]://www.[employer]/[email or web address]?[email or web address]&[email or web address]&[email or web address]&[email or web address]&[email or web address]&ps=1&group=38&it=1&jix=996    Childbirth and Parenting Education:   [facility]: http://Inherited HealthBuffalo General Medical CenterThe Pocket Agency/   (800) 979-BABY  [place]: (education, yoga & wellness) www.FlexyMind  Enlightened Mama: www.Victory Pharmaenedmama.TuneWiki   Childbirth collective: (Parent topic nights)  www.childbirthcollective.org/  Hypnobabies:  www.hypnobabiestwincities.com/  Hypnobirthing:  Http://hypnobirthing.com/    Breastfeeding Information:  An excellent 15-minute video on preparing for a good breastfeeding experience, including how to ensure you have a good milk supply and a comfortable latch, can be found here:  https://[employer].TuneWiki/      Book Recommendations:   Rach Janet's Birthing From [facility]--first few chapters include a new-age tone, you may prefer to skip it and keep going, because there is good stuff later.  This book recommendation covers emotional preparation, but does cover coping with pain, and use of both pharmacological and nonpharmacological methods.    Dr. Nielsen' The Pregnancy Book and The Birth Book--the pregnancy book goes month-by month    Womanly Art of Breastfeeding by La Leche League International   Bestfeeding by Nicolette Smith--great pictures    Mothering Your Nursing Toddler, by Noemy Chavez.   Addresses dealing with so many of the challenging behaviors of a nursing toddler.  How Weaning Happens, by La Leche League.  Discusses weaning at all ages, from medically necessary weaning of an infant, all the way up to age 5 (or  "older), with why/why not, and strategies.  Very empowering book both for deciding to wean and deciding not to.    American College of Nurse-Midwives (ACNM) http://www.midwife.org/; look at the informational handouts at http://www.midwife.org/Share-With-Women     www.mymidwife.org    Mother to Baby (Medication and Herbal guidance in pregnancy): http://www.mothertobaby.org  Toll-Free Hotline: 476.431.2620  LactMed (Medication use while breastfeeding): http://toxnet.nlm.nih.gov/newtoxnet/lactmed.htm    Women's Health.gov:  http://www.womenshealth.gov/a-z-topics/index.html    American pregnancy association - http://americanpregnancy.org    Centering Pregnancy (group prenatal care option): http://centeringhealthcare.org    Information about doulas:  Childbirth collective: http://www.childbirthcollective.org/  Doulas of [place] ([place]):  www.norberto.org  CPM [facility]  project: http://Alt12 Appscitiesdoulaproject.com/     Early Childhood and Family Education (ECFE):  ECFE offers parents hands-on learning experiences that will nourish a lifetime of teachable moments.  http://ecfe.info/ecfe-home/     [place] www.Quofore.NovaPlanner     FDA - Nutrition  www.mypyramid.gov  Under \"For Consumers,\" click on \"pregnant and breastfeeding women.\"      Centers for Disease Control and Prevention (CDC) - Vaccines : http://www.cdc.gov/vaccines/       When researching information on the web, question the validity of websites.  The domains .gov, .edu and.org tend to be more reliable information.  If there are a lot of advertisements, be cautious of the information provided. Stay away from blogs and chat rooms please!      Screening Program  Http://www.health.[facility].mn.us/newbornscreening/  [place] newborns are tested soon after birth for more than 50 hidden, rare disorders, including hearing loss and critical congenital heart disease (CCHD). This site provides resources and information for families and providers.    HEALTHY " PREGNANCY CARE: 37 to 41 WEEKS PREGNANT    Talk with your midwife or physician about when to call with signs of labor    Regular uterine contractions that are getting closer together and/or stronger    If you think your water has broken or is leaking    Bleeding from the vagina like a period (bloody vaginal discharge is normal)    If you are not feeling your baby move    Make plans for transportation and  as needed for when you are going to the hospital.    Your midwife or physician may offer to check your cervix for changes.     Ask your health care provider about vaccinations you may need following delivery. By now, you should have received a Tdap immunization to protect against pertussis or whooping cough. Fathers and family members who will be in close contact with the baby should also receive a Tdap shot at least two weeks before the expected birth of the baby if they have not had a Td (tetanus) shot for at least two years.    If you are past your due date, discuss the next steps leading to delivery with your midwife or physician. If you don't start labor on your own by 41 or 42 weeks, your midwife or physician may recommend giving you medicines to ripen your cervix and start labor.  Induction of labor: http://onlinelibrary.verde.com/store/10.1016/j.jmwh.2008.04.018/asset/j.jmwh.2008.04.018.pdf?v=1&t=baco3ftn&f=93pg062t0tu19d72t4s6hp8h385587m2ce1et614    Preparing for your baby: Tell your midwife or physician how you plan to feed your baby (breast or bottle), who you have chosen to do pediatric care for your baby, and if you have a boy, whether you have chosen to have him circumcised. You will need a car seat correctly installed in your vehicle to bring your baby home. As you start to set up the nursery at home for your baby, make sure the crib is safe. The mattress needs to fit snugly against the edges of the crib. If you can fit a soda can between the bars, they are too far apart and can allow the  baby's head to caught between them.    Learn about infant care and feeding, including information about infant CPR. We recommend that you put your baby to sleep on his or her back to reduce the chance of Sudden Infant Death Syndrome (SIDS). To maintain a healthy environment in which your child can grow, it's best to keep your home smoke-free. By preparing ahead, your transition into parenthood will go smoothly for you and your baby.    Your midwife or physician will want to see you for a checkup 2 to 6 weeks after delivery.    If you have questions about any symptoms you are experiencing or any other concerns, call your provider or their clinic staff at [facility] at Phone: [phone]. If it's after clinic hours, physician patients should call the Care Connection at [phone] ([phone]); midwife patients should call their answering service at [phone].    How can you care for yourself at home?   You can refer to the Starting Out Right book or find it online at http://www.healtheast.org/images/stories/maternity/[facility]-Starting-Out-Right.pdf or http://www.[facility].org/images/stories/flipbooks/[facility]-starting-out-right/[facility]-starting-out-right.html#p=8     You can sign up for a weekly parenting e-mail that gives support, tips and advice from health care professionals that starts with pregnancy and continues through the toddler years. To register, go to www.health[facility].org/baby at any time during your pregnancy.        Making Plans for Feeding My Baby    By this point, you probably have read a lot about feeding your baby.  Breastfeed or formula? Each mother s decision is her own and [facility] respects you and your choices. We ve gathered information on both breastfeeding and formula feeding to help with your decision. Talking with your physician or nurse-midwife can also help in your decision.  However you plan to feed your baby, [facility] encourage  rooming in with your baby, skin-to-skin contact and feeding your baby based on his or her cues.    Skin-to-skin contact  Being close to mom helps your baby adjust to life outside of the womb.  It helps your baby regulate their body temperature, heart rate, and breathing.  Your baby will usually be placed skin-to-skin immediately following birth or as soon as possible, if medical intervention is needed.    Rooming-In  Having your baby stay with you in your room is called  rooming-in .  Keeping your baby in your room helps you to learn how to care for your baby by getting to know your baby s cues, body rhythms and sleep cycle.       Cue-based feeding  Cues (signals) are baby s way of telling you what he or she wants.  When you learn your infant s cues, you know how to care for and feed your baby.   Feeding cues are the licking and smacking of lips, bringing their fist to their mouth, and a reflex called  rooting - where baby turns and opens his or her mouth, searching for the breast or bottle.  Crying is a late feeding cue.  Babies can feed frequently, often at least 8 times in 24 hours.    Breastfeeding facts  Breast milk is the best source of nutrition for your baby and is available at birth. In the first couple of days, your milk volume is already starting to increase, though it may not be noticeable. Breastfeed frequently to increase your milk supply. Within three to five days, you will begin to notice larger milk volumes. An increase in breast size, heaviness and firmness are often described as the milk  coming in.  Frequent breastfeeding can help breasts from getting overly firm and painful. You will know the baby is getting enough milk if your baby is having wet and dirty diapers and gaining weight.     If your goal is to exclusively breastfeed, it is important to not use any formula or artificial nipples (including bottles and pacifiers) while your baby is learning to breastfeed.  While it may seem like an   easy  option to give your baby a bottle, formula should only be given if there is a medical reason for your baby to have it.      Positioning and attachment   Get comfortable.  Use pillows as needed to support your arms and baby.  Hold baby close at the level of your breast, facing you in a tummy to tummy position.  Skin to skin helps with this.  Position the baby with his or her nose by the nipple.  There should be a straight line from baby s ear to shoulder to hips.  Tickle your baby s lips or wait for baby to open mouth wide, bring baby to breast by leading with the chin.  Aim the nipple at the roof of baby s mouth.  A rapid sucking pattern is followed by longer, drawing pattern with occasional swallows heard.  When baby is correctly latched, your nipple and much of the areola are pulled well into baby s mouth.      Returning to work or school  Focus on a good start to breastfeeding.  Many women continue to provide breastmilk for their baby when they return to work or school.  Making plans about where to pump and store milk can make the transition go well.  Talk with other mothers who have also returned to work or school for tips and support.  Your employer s Human Resource department may be a resource as well.     Returning to work or school: (continued)     babies can mean fewer  sick  days for you.    A quality breast pump will also save time and add comfort.  Check with your insurance prior to giving birth for breast pump coverage.  Many insurance companies include a pump within your benefits.    Wait until your baby is at least three weeks old to introduce a bottle for the first time.  Have someone besides you give the bottle.    Breastfeed when you are with your baby. Reserve your bottles of breast milk for when you are away.     Your breasts will need to be  emptied  either by your baby or a pump.  Plan to pump at least twice in an eight hour day.    If you cannot pump at work, continue  breastfeeding at home. Any amount of breast milk is worth giving to your baby.    Formula feeding facts  If you are planning to use formula to feed your baby, you will want to make some preparations ahead of time. Talk to your doctor or nurse-midwife about what type of formula to use. Some are iron-fortified, meaning they have extra iron in them. You will want to purchase formula and bottles before your baby is born to be sure you are ready after you return from the hospital. [facility] do not provide formula samples to take home.    Be sure to follow formula mixing directions closely. Regular milk in the dairy case at the grocery store should not be given to babies under 1 year old. Baby formula is sold in several forms including:    Ready-to-use. This is the most expensive, but no mixing is necessary.    Concentrated liquid. This is less expensive than ready-to-use and you mix with water.    Powder. This is the least expensive. You mix one level scoop of powdered formula with two ounces of water and stir well.    Most babies need 2.5 ounces of formula per pound of body weight each day. This means an 8-pound baby may drink about 20 ounces of formula a day; however, this is just an estimate. The most important thing is to pay attention to your baby s cues.  If your baby is always fussy, needs more iron or has certain food allergies, your physician may suggest you change your baby s formula to a different kind.     How do I warm my baby s bottles?  You may feed your baby a bottle without warming it first. It is OK for the breast milk or formula to be cool or room temperature. If your baby seems to prefer it warmed, you can put the filled bottle in a container of warm water and let it stand for a few minutes. Check the temperature of the liquid on your skin before feeding it to your baby; to be sure it isn t too hot. Do not heat bottles in the microwave. Microwaves heat food and liquids unevenly, and  this can cause hot spots that can burn your baby.    How do I clean and sterilize bottles?  Sterilize bottles and nipples before you use them for the first time. You can do this by putting them in boiling water for 5 minutes. After that first time, you can wash them in hot and soapy water. Rinse them carefully to be sure there is no soap left on them. You can also wash them in the .    [facility] Connection [phone] ([phone])    Baby Café    Pregnant and interested in breastfeeding?  Need answers to breastfeeding questions?  Want to help breastfeeding moms?  Already breastfeeding and want to meet other moms?    Join us at the Baby Café!    Baby Cafe is a free, drop-in service offering breast-feeding support for pregnant women, breast-feeding mothers and their families.  Come share tips and socialize with other mothers.  Babies and siblings are welcome (no childcare available).    Starting April 2018, Baby Café will be at 4 locations.  Please see below for the Baby Café closest to you!      [facility]  [address] [place] [address]  1st Wednesday: 10am-12pm    [facility]  [address] [place] [address]  3rd Wednesday 4-6pm    [facility]  [address] [place] [address]  4th Wednesday 10am-12:30pm    [facility]  [address] [place] [address]  4th Wednesdays: 4-6pm      Hmong, Niuean, and Citizen of [place] which is may be available at some sites.    For more information, please contact: [email or web address]. or [phone]

## 2021-06-06 NOTE — PROGRESS NOTES
Assessment:          Normal postpartum recovery  S/P normal spontaneous vaginal delivery  S/p 2nd degree perineal laceration  history of breastfeeding difficulty, nipple pain  Negative depression screen    Plan:        1. Routine postpartum care  2. Discussed her lactation visit and follow up for nipple pain. Feels she has a good plan in place and happy for the benefit of her last visit.  3. Outpatient lactation resources reviewed including [facility], La Leche League, community groups. Lactation referral provided as needed.  4. Reviewed PP depression, anxiety, and psychosis s/sx, self care measures to reduce risk, safety plan and crisis numbers, and when to call for further resources. Written information provided for community resources and instructed to call on-call CNM with concerns or schedule clinic visit as needed.  5. Return to clinic in 4 weeks for a routine exam following birth    Subjective:       Mar Kaur is a 39 y.o. female who presents for a postpartum follow up visit. She is 2 week postpartum following a normal spontaneous vaginal delivery with 2nd degree and perineal laceration. The delivery was at [facility] due to the rapid onset of active labor at 41 gestational weeks. Felt well cared for but did have some disjointed care postpartum in the hospital. I have fully reviewed the prenatal and intrapartum course. The amount and color of the lochia is appropriate for the duration of recovery. Mar feels well and postpartum course has been complicated by breastfeeding difficulty; seeing lactation with good success. Taking soy lecithin now and recommended to take B12 for possible vasospasms. Baby Leodan's  course has been stable. The baby, Leodan, is being fed by Breastfeeding and EBM. Voiding without difficulty, lochia normal, tolerating normal diet, and passing flatus and normal bowel movements.  Pain is well controlled with current medications. Mar offers no  "emotional concerns.  Postpartum depression screening score: 0, negative #10.     The following portions of the patient's history were reviewed and updated as appropriate: allergies, current medications and problem list    Review of Systems  General:  Denies problem  Eyes: Denies problem  Ears/Nose/Throat: Denies problem  Cardiovascular: Denies problem  Respiratory:  Denies problem  Gastrointestinal:  Denies problem, Genitourinary: Denies problem  Musculoskeletal:  Denies problem  Skin: Denies problem  Neurologic: Denies problem  Psychiatric: Denies problem  Endocrine: Denies problem  Heme/Lymphatic: Denies problem   Allergic/Immunologic: Denies problem          Objective:         Vitals:    03/05/20 1049   BP: 118/76   Pulse: 72   Weight: (!) 227 lb 8 oz (103.2 kg)   Height: 5' 8.5\" (1.74 m)   Body mass index is 34.09 kg/m .      Physical Exam:  General Appearance: Alert, cooperative, no distress, appears stated age  Skin: Skin color, texture, turgor normal, no rashes or lesions.  Throat: Lips, mucosa, and tongue normal; teeth and gums normal  HEENT: grossly normal; otoscopic and opthalmic exam not performed.   Neck: Supple, symmetrical, trachea midline, no adenopathy  Respiratory: Normal respiratory effort  Cardiovascular: No peripheral edema.  Musculoskeletal: No digital cyanosis. Normal gait and station. Moves all limbs freely.  Neuro: Cranial nerves II-XII grossly intact.  Psych: Intact judgment and insight. OX3 and conversational.    Total time with patient 15 minutes with >50% on education, counseling and coordination of care.  Emelia Romero, DNP, APRN, CNM    "

## 2021-06-06 NOTE — TELEPHONE ENCOUNTER
Phone Call at 9:39 AM: Called patient to check in as she spoke with on-call CNM overnight and was planning to come in for labor eval. No answer. LVM letting her know I was checking in. SYLVIA Painter,STEPHANIEM

## 2021-06-06 NOTE — PATIENT INSTRUCTIONS - HE
POSTPARTUM INFORMATION AND RESOURCES:    Congratulations on the birth of your baby. We have gathered together some information on staying healthy in the postpartum time including information on exercise, nutrition and mental health. We have also listed some local and national resources for lactation support and mental health support.    If you have questions or concerns and need to speak with a midwife immediately, please call the on-call midwife at [phone].    If you have a non-emergent question or would like to schedule a follow up appointment, please call the clinic midwife at [phone].    Thank you for sharing your birth experience with the [facility] Midwives.  Congratulations on the birth of your baby!    ---------------------------------------------------------------------------------------------------------  EXERCISE & NUTRITION:     Getting and Staying Active: A Healthy You!   -Why should I exercise? Exercise, being physically active, is very important for all women. Being active can help you:   Lose or maintain weight   Have more energy   Sleep better   Enjoy sex more   Relieve stress and think better   Lower the chance you will have heart disease, high blood pressure, and diabetes   Strengthen your bones and muscles   Have fewer hot flashes if you are older   -How active do I have to be to get the bene?ts of exercise?  Studies show that as little as 15 minutes of moderate exercise--like fast walking or dancing--3 times a week can improve the health of your heart. Ifyou want to get the best benefits from exercise, try to increase your physical activity to at least 30 minutes, 5 times a week. If you have a serious health problem,be sure to talk with your health care provider before starting an exercise program.     Taking Care of your Health: Health Care Maintenance Screening recommendations   In all the things women do, taking care of everything and everybody else, they sometimes forget to take  care of themselves. This handout reviews the health screenings and vaccines that are recommended for women of all ages. Talk with yourhealth care provider about which tests and vaccines you need. The chart below lists the screening tests and vaccinations recommended for healthy women who do not have a high risk for most diseases.   The recommendations in thischart are guidelines only. For some tests and vaccines, the chart says you should talk to your health care provider.This is because the best recommendations for you depend on your personal health history. Your health care provider may suggest more frequent testing or additional tests ifyou havea higher chance of getting some diseases     Planning Your Family: Developing a Reproductive Life Plan   Planning ahead can help you avoid getting pregnant when you don t want to be pregnant and also be in good health if and when you do decide to become pregnant. Many women have at least one pregnancy in their lives, even if it was not planned. In fact, about half of all pregnancies are not planned. Getting pregnant when you did not plan it can be a problem, or it can turn into a happy event. Planning pregnancy leads to healthier pregnancies, healthier mothers, and healthier families.   Although many women want to have a family, not everyone wants to have children. More and more women are childless by choice (also known as childfree). Whether to have children is a personal choice that only you can make. It s okay not to want children! If you never want to get pregnant, it is important to make sure you always use very effective birth control, such as an intrauterine device, the birth control implant, female sterilization (having your tubes tied), or your partner having a vasectomy.     Reliable resources:   ?   American College of Nurse-Midwives (ACNM) http://www.midwife.org/; look at the informational handouts at http://www.midwife.org/Share-With-Women   ??   Women's  "Health.gov:   http://www.womenshealth.gov/a-z-topics/index.html   FDA - Nutrition ?www.mypyramid.gov? Under \"For Consumers,\" click on \"pregnant and breastfeeding women.\"   ?   Vaccines : Centers for Disease Control and Prevention (CDC) http://www.cdc.gov/vaccines/   ?   [facility] www.BonitaTeranetics.com   ?   When researching information on the web, question the validity of websites.? The domains .gov, Corporate Times andAmaruorg tend to be more reliable information.? If there are a lot of advertisements, be cautious of the information provided. Stay away from blogs and chat rooms please!   ?   ?   Nutrition and supplements:   Daily multivitamin vitamin (with 400 - 1000 mcg folic acid).? Take with food as needed.   ?   4-5 servings of dairy or other calcium rich foods (fish, leafy greens, soy)?per day - if not, take 500-1000 mg additional calcium (Tums, pills, chews). Take with dairy.   ?   Vitamin D3 4000 IU geltab daily. Vitamin D rich foods: Cod Liver Oil (1Tbsp), [place], Mackerel, Tuna, fortified milk and yogurt, Beef and liver, sardines, egg, fortified cereals, swiss cheese.? Take supplements with fattiest meal.?   ?   2-3 (4) oz servings of fish, seafood, nuts (walnuts & almonds), oils, avocado per week - if not, take Omega 3 Fatty acids: DHA & MARIELLA 6022-3582 mg per day. ?Other names: cod liver oil, fish oil. Take with fattiest meal.   ?   ?---------------------------------------------------------------------------------------------------------  POSTPARTUM & LACTATION RESOURCES :    Breastfeeding:   OUTPATIENT LACTATION RESOURCES   -Schedule an appointment with a [facility] BRANDIE who is also a Lactation Consultant by calling [phone]. Graciela Bullard IBCLC, CNM at [facility] on Monday, STEPHANIE PalacioM at [facility] on Tuesdays and [facility] on Thursdays.   [facility] Lactation Clinics located at [facility] and [facility]   Call: [phone].     Inpatient support     Outpatient " appointments     Telephone consultation     Breast-feeding classes available through [facility]     [facility]/WIC/[facility] health improvement partnership:       Baby Café    Pregnant and interested in breastfeeding?  Need answers to breastfeeding questions?  Want to help breastfeeding moms?  Already breastfeeding and want to meet other moms?    Join us at the Baby Café!    Baby Cafe is a free, drop-in service offering breast-feeding support for pregnant women, breast-feeding mothers and their families.  Come share tips and socialize with other mothers.  Babies and siblings are welcome (no childcare available).    Starting April 2018, Baby Café will be at 4 locations.  Please see below for the Baby Café closest to you!      [facility]  [address] [place] [address]  1st Wednesday: 10am-12pm    [facility]  [address] [place] [address]  3rd Wednesday 4-6pm    [facility]  [address] [place] [address]  4th Wednesday 10am-12:30pm    [facility]  [address] [place] [address]  4th Wednesdays: 4-6pm      Hmong, Croatian, and Cymraes which is may be available at some sites.    For more information, please contact: [email or web address]. or [phone]    -[facility]:  www.[email or web address] Medical [email or web address]   -Attend a baby weigh in at [facility]. Lactation consultants are available to answer questions   [place]: Tuesdays 1:00 - 2:00   [facility]: Mondays 1:00 - 2:00   -Attend a New Mamma group or a Second Time Mama group at [place].     -Enlightened Mama: www.enlightenedmama.com   -Attend one of the New Mama groups at [facility] in [facility]. [facility] also offers one-on-one in home and in office lactation consults.     -[place]: www.federico.org/   -Attend a LeLeche League meeting. Multiple groups in several locations throughout the [facility]. The meetings are no-cost  "and always informative breastfeeding education session through [employer] La Leche League    Held at [facility] the second Thursday of each month at 7pm    - Information on medication use while breastfeeding: http://toxnet.nlm.nih.gov/newtoxnet/lactmed.htm     Other Online Breastfeeding Resources:     [facility]altheast.org/baby sign up for free online weekly e-mail     healtheast.org/maternity     Breastfeedingmadesimple.com     Llli.org (La Leche League)     Normalfed.com     Womenshealth.gov/breastfeeding     Workandpump.com     Kellymom.com    [email or web address]://[email or web address]   Click on \"Learn More About Attachment\"    -New Parent Connection Drop-In:  In collaboration with [facility], Early Childhood Family Education (ECFE), a program of [address], offers ongoing classes for new parents in their infants.  The connection classes offer support and resources to parents during the exciting and challenging period of transition following the birth of her baby.  Parents and babies (birth to 6 months of age) may join the group at any time.  Baby's birth to 3 months meet Tuesdays, from 1230 to 1:30 PM  Babies 3-6 months meet Tuesdays, from 4 to 5 PM    -Hello Chair New Mama Group: www.Splashscore/free-new-mama-group  -Attend Roadmap New Mama. Groups located at three locations:  [place], [place] and [place]. Sign up online.       Additional Resources:?   -American College of Nurse-Midwives (ACNM) http://www.midwife.org/; look at the informational handouts at http://www.midwife.org/Share-With-Women  www.mymidwife.org   ?   -Women's Health.gov:   http://www.womenshealth.gov/a-z-topics/index.html   ?   -Early Childhood and Family Education (ECFE):   ECFE offers parents hands-on learning experiences that will nourish a lifetime of teachable moments.   http://ecfe.info/ecfe-home/ "       -----------------------------------------------------------------------------------------------------------   (Before, during and after pregnancy)   MOOD DISORDER RESOURCES :    Are you feeling sad or depressed?     Do you feel more irritable or angry with those around you?     Are you having difficulty bonding with your baby?     Do you feel anxious or panicky?     Are you having problems with eating or sleeping?     Are you having upsetting thoughts that you can t get out of your mind?     Do you feel as if you are  out of control  or  going crazy ?     Do you feel like you never should have become a mother?     Are you worried that you might hurt your baby or yourself?    Any of these symptoms, and many more, could indicate that you have a form of  mood or anxiety disorder, such as postpartum depression. While many women experience some mild mood changes during or after the birth of a child, 15 to 20% of women experience more significant symptoms of depression or anxiety. Please know that with informed care you can prevent a worsening of these symptoms and can fully recover. There is no reason to continue to suffer.  Women of every culture, age, income level and race can develop  mood and anxiety disorders. Symptoms can appear any time during pregnancy and the first 12 months after childbirth. There are effective and well-researched treatment options to help you recover. Although the term  postpartum depression  is most often used, there are actually several forms of illness that women may experience.    Resources:    -Pregnancy and Postpartum Support [place]: www.ppsupportmn.org  Who We Are: We are a group of mental health &  practitioners, service organizations, and mother volunteers who provides services to those struggling with a pregnancy, loss, or postpartum mood disorder through the Helpline, professional training, our resource list and website.  What We Do: We  "provide support, advocacy, awareness, and training about  mental health in [place].     Community Resource List:   This is a list of  resources within our community.   http://[facility].org/communityresourcelist    PSYCHOTHERAPISTS/CONSULTANTS   This is a list of licensed mental health professionals who have advanced clinical skills in the treatment of postpartum mood and anxiety disorders (PMADs).   Http://[facility].org/mentalhealthproviderresourcelist   INTEGRATIVE MEDICINE PRACTITIONERS:   This is a list of licensed providers who practice Integrative Medicine: a form of treatment that combines alternative medicine with evidence based medicine in an effort to treat the  whole person  (the person, not just the disease).   http://[facility].org/integrativemedicineproviders    PSYCHIATRIC CARE   This is a list of licensed Psychiatrists who have advanced clinical skills in the treatment and medication management for PMADs and lactating mothers.   Http://[facility].org/psychiatryproviderresroucelist   Click on the links below to find detailed profiles and contact information of providers who have been screened and approved as having advanced training in the area of PMADs. Please note: [facility] does not endorse a specific provider.   The list is in alphabetical order by city. You can also search for providers by city or zip code using the search box. Please click on the \"Show\" box to the upper right to advance to the next page. You may also call our Helpline at [phone] if you would like for our Helpline volunteer to find a provider for you.    -Postpartum Depression Support Group:   Weekly groups at no cost through [facility], , 1:30-3:00 p.m., at [facility], [address] [facility], [address]. [place], , 1:30-3:00 p.m., at [facility], [address] To register for the " group or get more information, call [phone].   -Postpartum Depression Support Group:   Outpatient Intensive Treatment program for women with  mood disorders [facility] Mother/Baby Program     -[facility]  Resource Guide     Women s Mental Health at [facility]  This website provides a range of current information including discussion of new research findings in women s mental health and how such investigations inform day-to-day clinical practice. Despite the growing number of studies being conducted in women s health, the clinical implications of such work are frequently controversial, leaving patients with questions regarding the most appropriate path to follow. Providing these resources to patients and their doctors so that individual clinical decisions can be made in a thoughtful and collaborative fashion dovetails with the mission of our Center.    https://womensmentalhealth.org/      If you re having thoughts of harming yourself or your baby, it is vital to get support immediately. Call 911 or go to the nearest E.R.     TOLL-FREE / NATIONWIDE EMERGENCY ASSISTANCE   Immediate Emergency:  911   National Suicide Prevention Lifeline:   1-673.709.8175   Suicide Prevention Hotline:   0-952-SOLKJEC   National Postpartum Depression Hotline:   -PPD-MOMS   Postpartum Support International (PSI)   PPD Helpline: (not a 24-hour hotline)   1-472.295.7734

## 2021-06-07 NOTE — PROGRESS NOTES
"Mar Kaur is a 39 y.o. female who is being evaluated via a billable telephone visit.      The patient has been notified of following:     \"This telephone visit will be conducted via a call between you and your physician/provider. We have found that certain health care needs can be provided without the need for a physical exam.  This service lets us provide the care you need with a short phone conversation.  If a prescription is necessary we can send it directly to your pharmacy.  If lab work is needed we can place an order for that and you can then stop by our lab to have the test done at a later time.    If during the course of the call the physician/provider feels a telephone visit is not appropriate, you will not be charged for this service.\"     Patient has given verbal consent to a Telephone visit? Yes    Mar Kaur complains of    Chief Complaint   Patient presents with     Postpartum Care       I have reviewed and updated the patient's Past Medical History, Social History, Family History and Medication List.    ALLERGIES  Patient has no known allergies.    Additional provider notes: 6-week Postpartum Visit:     Assessment:   1.  6 weeks postpartum, status post normal spontaneous vaginal birth (rapid labor and birth, [facility])  2.  Second-degree perineal laceration, healing well  3.  Left nipple pain resolved  4.  Breast-feeding  5.  Contraceptive management  6.  Rh-  7.  Macrosomic baby (10 pounds 2 ounces)    Plan:     -Warning signs of breast infections of mastitis and thrush reviewed. Breastfeeding support resource list and contact info given, including [facility] Lactation Consultant and [facility] Lactation Consultants.   -Encouraged to continue with PNV, Vitamin D and DHA supplements.   -Return of fertility discussed. Plans condoms and natural family planning for contraception.   -Reviewed warning signs of pelvic pain, excessive bleeding or abdominal pain, fever/chills, or signs of " "breast infection.   -RTC in 1 year for routine health maintenance or sooner as needed.     Subjective:   Mar is a 40 yo, on the telephone for her 6 week postpartum appointment. She is integrating birth experience/care and transition well.  This second labor was so fast that they had to pull over and deliver at [facility] in route to [facility].  Second-degree perineal laceration with repair.  Baby \"JAN\" weighed 10 pounds 2 ounces!  Bleeding and uterine cramping have ceased. Denies pain. Reports normal bowel and bladder functioning. EPDS score 0/30. Reports good family/friend support.  Breastfeeding well-established.  Milk bleb on left nipple including vasospasm which she worked through with the help of SYLVIA Palacio, BRANDIE, IBCLC.  Denies pain or concerns. Plans to use NFP/condoms for contraception.   States that she received Rhophylac postpartum in the hospital.  (Mar was seen on 3/5/2020 for a 2-week postpartum visit.  Please see progress note.)    Review of Systems  Pertinent items are noted in HPI.      Objective:   Alert and in no apparent distress         Phone call duration: 8 minutes    JR Rosas APRN, BRANDIE    "

## 2021-06-16 PROBLEM — O26.899 RH NEGATIVE, ANTEPARTUM: Status: ACTIVE | Noted: 2020-04-02

## 2021-06-16 PROBLEM — Z67.91 RH NEGATIVE, ANTEPARTUM: Status: ACTIVE | Noted: 2020-04-02

## 2021-06-16 PROBLEM — Z30.8 ENCOUNTER FOR OTHER CONTRACEPTIVE MANAGEMENT: Status: ACTIVE | Noted: 2020-04-02

## 2021-06-20 NOTE — LETTER
Letter by Diann Winters APRN, CNM at      Author: Diann Winters APRN, CNM Service: -- Author Type: --    Filed:  Encounter Date: 3/3/2020 Status: (Other)         March 3, 2020     Shea De Jesus MD  [address] [facility] [address]    Patient: Leodan Kaur   Date of Birth: [birth year]   Date of Visit: 3/3/2020       Dear Dr. Neal MD:      I saw Leodan with his mother Mar [facility]patient Lactation services at the [facility] today.   Below are the relevant portions of my assessment and plan of care.         If you have questions, please do not hesitate to call me. I look forward to following Leodan along with you.    Sincerely,        SYLVIA Valdes CNM , IBCLC      CC  No Recipients                                 Assessment:   1.  12 day old infant gaining weight well on expressed breastmilk with some formula supplementation--now over birthweight  2.  Good latch, suck and moderate milk transfer in office today  3.  Mother with likely milk bleb on left nipple, as well as some symptoms of vasospasm  4.  Milk supply slightly low in relation to needs of macrosomic infant--in need of some supplementation    Plan:   1.  Offer Leodan the breast as often as he cues to feed, giving just the right side until the left is feeling comfortable enough for direct nursing.  Pump both sides after feeding, to have enough to offer Leodan for supplements and increase supply.  2.  Discussed physiology of milk blebs and how they can produce deeper pain in breast as well as at nipple:  Advised to keep warm moist heat on nipple for as much of the day as possible, and when not able to do that, keep a a cotton ball with olive oil on nipple to keep that area moist.    3.  Add supplements of lecithin, 1200 mg three times daily, and Vitamin B6, 200 mg for the first four days and then 50 mg/day for the next two weeks, to help with milk flow and with vasospasm pain.  4.  Explained that Leodan needs about 25 oz of milk  "each day to grow well.  If he nurses at home as he did in the office today, about 10 times/day, he needs about 7 oz per day in supplementation, using your breastmilk as your first choice and formula when the supply of pumped milk runs out.  He would need about 14 oz/day if you are only feeding from one breast.  You can give this after feedings, or distributed throughout the day according to his feeding cues.  5.  Lactation will follow up in 2 days by Steven.    Subjective: Mar is here today because she is having painful nipples, primarily on the left breast.  She reports that baby had good latch initially, but during first week pain increased, and then when Leodan was about about one week old suddenly developed severe, \"stabbing, radiating,\" pain on the left side.  At that time went to exclusive pumping because of pain.  This was helpful until yesterday, when pain has also become present with pumping.  Mar reports that although pain begins with nursing or pumping, it continues for about two hours after nursing, and comes and goes in tingling \"waves\" of pain.  Was seen by IBETH at [place] four days ago and felt to have thrush, given nature of pain shooting through breast, so was prescribed antifungal ointment for nipples.  Baby does not have any symptoms of thrush.  Mar also spoke with LORE Bullard CNM, IBCLC yesterday, and after describing symptoms was felt to perhaps have vasospasm, so in addition to using antifungal cream has been using heat to breasts after feeding, which has been somewhat helpful.  She has not noticed any improvement from the antifungal prescription.  Finally, she has concerns about low milk supply, which she also had with her first child (reports that she was able to pump max of about 2 oz).  Although carol Alcocer is taking 4-5 oz from bottles at each feeding, she is able to pump between 1 1/2 and 3 1/2 oz.      Of note, Mar does have a history of removal of fibroadenoma from her right " breast.    Breastfeeding Goals:  Exclusive breastfeeding    Previous Breastfeeding Experience:   first child x 8 months.   Had issues with low supply and painful nursing    Infant's name: Leodan     Infant's bday: [birth year]   Gestational age: 41w2d  Infant's birth weight: 10# 2 oz       Mode of delivery: vaginal   Infant's MD: Dr. Shea De Jesus, [place].  Mar gives her permission for today's note to be forwarded to Dr. De Jesus.  DEYSI signed and filed in Mar's chart as Leodan has no active pediatric chart at .  Discharge weight: 9# 12 oz     Frequency and duration of feedings: every 2-3 hours, usually taking about 4-5 oz of pumped milk or formula  Swallows audible per mother: not currently at breast  Numbers of feedings in 24 hours: 10  Number urines per day: 8  Number of stools per day and their color: 6    Supplementation: with some formula, about 25% of supplementation is formula  Pumping: every 2-4 hours, yielding about 1/2 oz from left side and 1-3 from right side     Objective/Physical exam:     Mother: Noticed breasts grew larger and areolas darkened during pregnancy and she noticed primary engorgement when her milk came in.     Her nipples are everted, the areola is compressible, the breast is soft and full.  Left nipple has whitish-yellow area on tip with some redness surrounding;  Appears to be milk bleb    Sore nipples: yes  EPDS: 0    Assessment of infant: 84.9% Weight for age percentile   Age today: 12 days  Today's weight: 10# 5.6 oz  Amount of milk transferred from LEFT side: 1 oz  Amount of milk transferred from RIGHT side: 0.8 oz    Baby has full flexion of arms and legs, normal tone, behavior is alert and active, respirations are normal, skin is normal, hydration is normal, jaw is normal size and alignment, palate is normal, frenulum is normal, baby can lateralize tongue, has adequate tongue lift, and tongue can protrude past bottom gum line.    Baby thrush: none      Jaundice: none       Feeding assessment: Baby can hold suction with tongue while at the breast.     Alignment: The baby was flex relaxed. Baby's head was aligned with its trunk. Baby did face mother. Baby was in cross cradle and football positions today.     Areolar Grasp: Baby was able to open mouth wide. Baby's lips were not pursed. Baby's lips did flange outward. Tongue was visible just barely over bottom lip. Baby had complete seal.     Areolar Compression: Baby made rhythmic motion. There were no clicking or smacking sounds. There was significant nipple discomfort while nursing on the left side, as well as about 5 minutes after completion of feeding session on that side.  Nipples appeared rounded after feeding and no blanching was noted.     Audible swallowing: Baby made quiet sounds of swallowing: There was an increase in frequency after milk ejection reflex. The milk ejection reflex is normal and milk supply is just slightly low.     Diann Winters, SYLVIA, CNM, IBCLC

## 2021-07-14 PROBLEM — O48.0 POST-TERM PREGNANCY, 40-42 WEEKS OF GESTATION: Status: RESOLVED | Noted: 2020-02-13 | Resolved: 2020-03-05

## 2021-07-14 PROBLEM — O26.899 RH NEGATIVE STATE IN ANTEPARTUM PERIOD: Status: RESOLVED | Noted: 2019-07-18 | Resolved: 2020-03-05

## 2021-07-14 PROBLEM — Z67.91 RH NEGATIVE STATE IN ANTEPARTUM PERIOD: Status: RESOLVED | Noted: 2019-07-18 | Resolved: 2020-03-05

## 2021-07-14 PROBLEM — O09.521 MULTIGRAVIDA OF ADVANCED MATERNAL AGE IN FIRST TRIMESTER: Status: RESOLVED | Noted: 2019-07-19 | Resolved: 2019-08-21

## 2021-09-26 ENCOUNTER — HEALTH MAINTENANCE LETTER (OUTPATIENT)
Age: 41
End: 2021-09-26

## 2022-01-18 VITALS
BODY MASS INDEX: 33.69 KG/M2 | HEART RATE: 72 BPM | HEIGHT: 69 IN | DIASTOLIC BLOOD PRESSURE: 76 MMHG | WEIGHT: 227.5 LBS | SYSTOLIC BLOOD PRESSURE: 118 MMHG

## 2022-01-18 VITALS
BODY MASS INDEX: 35.6 KG/M2 | HEIGHT: 69 IN | DIASTOLIC BLOOD PRESSURE: 70 MMHG | SYSTOLIC BLOOD PRESSURE: 124 MMHG | WEIGHT: 240.4 LBS | HEART RATE: 80 BPM

## 2022-01-18 VITALS
BODY MASS INDEX: 36.48 KG/M2 | HEIGHT: 69 IN | DIASTOLIC BLOOD PRESSURE: 80 MMHG | WEIGHT: 246.3 LBS | SYSTOLIC BLOOD PRESSURE: 104 MMHG | HEART RATE: 88 BPM

## 2022-01-18 VITALS
SYSTOLIC BLOOD PRESSURE: 108 MMHG | DIASTOLIC BLOOD PRESSURE: 62 MMHG | BODY MASS INDEX: 36.58 KG/M2 | HEIGHT: 69 IN | WEIGHT: 247 LBS | HEART RATE: 82 BPM

## 2022-01-18 VITALS
WEIGHT: 248.4 LBS | SYSTOLIC BLOOD PRESSURE: 120 MMHG | HEART RATE: 84 BPM | DIASTOLIC BLOOD PRESSURE: 78 MMHG | BODY MASS INDEX: 36.79 KG/M2 | HEIGHT: 69 IN

## 2022-01-18 VITALS
DIASTOLIC BLOOD PRESSURE: 70 MMHG | HEIGHT: 69 IN | BODY MASS INDEX: 36.33 KG/M2 | WEIGHT: 245.3 LBS | SYSTOLIC BLOOD PRESSURE: 116 MMHG | HEART RATE: 72 BPM

## 2022-01-18 VITALS
SYSTOLIC BLOOD PRESSURE: 112 MMHG | BODY MASS INDEX: 36.4 KG/M2 | HEIGHT: 69 IN | HEART RATE: 80 BPM | WEIGHT: 245.8 LBS | DIASTOLIC BLOOD PRESSURE: 70 MMHG

## 2022-02-16 NOTE — TELEPHONE ENCOUNTER
---------------------  From: Abigail Parkinson RN (Phone Messages Pool ([id number]_[facility]))   To: BEARGABEMAR    Sent: 3/2/2020 10:03:53 AM CST  Subject: RE: Follow up on suspected thrush     Hi Mar,    Would you like this message forwarded to Dr. Shea De Jesus?  I just want to make sure I am sending it to the correct provider.     Thank you,    Abigail MITCHELL RN      ---------------------  From: MAR AMARO  To: [facility]  Sent: 03/02/2020 09:44 a.m. CST  Subject: Follow up on suspected thrush  Update on my suspected thrush in breast: after two days of ointment treatment, my milk supply depleted by half or more. Pumping every 2-3 hours yielding 0.25-1 oz. from the left breast (the symptomatic one when nursing), prior to ointment treatment the left breast yielded 1-2 oz average. The right breast which I am treating with ointment used to yield 1-2.5 oz., though after treatment began, decreased to 0.5-1.5 oz. All weekend my breasts felt full, tender to the touch. We have been supplementing with formula. Pumping hasn t been painful.    Last night, I pumped 0.5 oz. on each side at 1 AM and  again at 4 AM.    At 7:15 AM, I pumped and my right breast yielded 3 oz. and my left 2 oz. (Yay!), except my left breast, just under my areola area, is now experiencing pins and needles (again), now after pumping (previously I only had pain post nursing), as long as an hour or more after pumping concluded.    Leodan is not exhibiting any symptoms I can see (no white spots in mouth, etc.).    I have been keeping my doctor (midwives) updated with this info as well (though haven t heard back from them yet). I have a 2 week post partum appointment on Thursday with them.  Mar

## 2022-02-16 NOTE — TELEPHONE ENCOUNTER
---------------------  From: Leonora Kaminski CMA (Phone Messages Pool ([id number]_[facility]))   To: MAR AMARO    Sent: 3/30/2020 8:08:36 AM CDT  Subject: RE: Leodan Amaro 2 month wellcheck and immunizations     This patient inquiry does not match the sender s portal name.  For security and privacy reasons we are not able to respond to the inquiry sent from this portal.  Please resubmit the inquiry from the patient s portal or call us directly.  Thank you.        ---------------------  From: MAR AMARO  To: [facility]  Sent: 2020 06:56 p.m. CDT  Subject: Leodan Amaro 2 month wellcheck and immunizations  Hello,  I have yet to make an appointment for .y son Leodan Amaro (: 2018) for his 2 month wellcheck with Dr. De Jesus. Given COVID-19, we ve been extremely cautious with quarantine at home given his vulnerable immune system. Should he be scheduled for a 2-month well check? Or if there are no concerns, continue to wait safe at home? I want him to move forward with his immunization schedule though not at the rise of exposure to COVID-19...  Let me know what you think,  Mar

## 2022-02-16 NOTE — TELEPHONE ENCOUNTER
---------------------  From: Lisa Hodgson CMA   To: JASWANT AMARO    Sent: 3/2/2020 11:02:48 AM CST  Subject: Portal Message     I think this all sounds great.  Thanks for the update, glad your milk supply has picked up!   MD Elier

## 2022-02-16 NOTE — TELEPHONE ENCOUNTER
---------------------  From: Leoonra Kaminski CMA (Phone Messages Pool ([id number]_[facility]))   To: SONDRA MAR H    Sent: 3/6/2020 1:35:36 PM CST  Subject: RE: Immunization record     This patient inquiry does not match the sender s portal name.  For security and privacy reasons we are not able to respond to the inquiry sent from this portal.  Please resubmit the inquiry from the patient s portal or call us directly.  Thank you.    Please send attachment through patients chart.      ---------------------  From: MAR AMARO  To: [facility]  Sent: 03/06/2020 12:22 p.m. CST  Subject: Immunization record  Hello,  Can you help complete this for my son Brent Amaro? Prior to [facility], he had care at [facility].  Thank you,  Mar

## 2022-02-16 NOTE — TELEPHONE ENCOUNTER
---------------------  From: Abigail Parkinson RN (Phone Messages Pool ([id number]_[facility]))   To: ARM Message Pool ([id number]_[place] [facility] [place]);     Sent: 3/2/2020 10:39:52 AM CST  Subject: FW: Follow up on suspected thrush           ---------------------  From: MAR AMARO  To: [facility]  Sent: 03/02/2020 10:32 a.m. CST  Subject: RE: Follow up on suspected thrush  Yes, thank you. Let her know I have stayed in tough with Yissel, lactation nurse at the [facility], plus I made an appointment with the midwives lactation person as well tomorrow.  Mar  ---------------------  From: Abigail Parkinson RN (Phone Messages Pool ([id number]_[facility]))  To: MAR AMARO  Sent: 3/2/2020 10:03:53 AM CST  Subject: RE: Follow up on suspected thrush       Andrea Manuel,       Would you like this message forwarded to Dr. Shea De Jesus?  I just want to make sure I am sending it to the correct provider.       Thank you,       Abigail MITCHELL RN            ---------------------  From: MAR AMARO  To: [facility]  Sent: 03/02/2020 09:44 a.m. CST  Subject: Follow up on suspected thrush  Update on my suspected thrush in breast: after two days of ointment treatment, my milk supply depleted by half or more. Pumping every 2-3 hours yielding 0.25-1 oz. from the left breast (the symptomatic one when nursing), prior to ointment treatment the left breast yielded 1-2 oz average. The right breast which I am treating with ointment used to yield 1-2.5 oz., though after treatment began, decreased to 0.5-1.5 oz. All weekend my breasts felt full, tender to the touch. We have been supplementing with formula. Pumping hasn t been painful.  Last night, I pumped 0.5 oz. on each side at 1 AM and  again at 4 AM.  At 7:15 AM, I pumped and my right breast yielded 3 oz. and my left 2 oz. (Yay!), except my left breast, just under my areola area, is now experiencing pins and needles (again), now after pumping  (previously I only had pain post nursing), as long as an hour or more after pumping concluded.  Leodan is not exhibiting any symptoms I can see (no white spots in mouth, etc.).  I have been keeping my doctor (midwives) updated with this info as well (though haven t heard back from them yet). I have a 2 week post partum appointment on Thursday with them.  Mar---------------------  From: Lisa Hodgson CMA (rubberit ([id number]_[facility]))   To: Shea De Jesus MD;     Sent: 3/2/2020 10:46:04 AM CST  Subject: FW: Follow up on suspected thrush---------------------  From: Shea De Jesus MD   To: [employer]it ([id number]_[place] [facility] [place]);     Sent: 3/2/2020 11:01:38 AM CST  Subject: RE: Follow up on suspected thrush     I think this all sounds great.  Thanks for the update, glad your milk supply has picked up!   Jose Thapa via portal.

## 2022-02-17 PROBLEM — O09.529 SUPERVISION OF HIGH-RISK PREGNANCY OF ELDERLY MULTIGRAVIDA: Status: RESOLVED | Noted: 2019-07-19 | Resolved: 2020-04-02

## 2022-07-02 ENCOUNTER — HEALTH MAINTENANCE LETTER (OUTPATIENT)
Age: 42
End: 2022-07-02

## 2022-08-02 ENCOUNTER — NURSE TRIAGE (OUTPATIENT)
Dept: NURSING | Facility: CLINIC | Age: 42
End: 2022-08-02

## 2022-08-02 NOTE — TELEPHONE ENCOUNTER
Pt calling with concerns about;    States she home tested covid positive today and is wondering if she needs to go somewhere to have a PCR done.    headache  Sore throat  Fatigue    Pt Denies;  Cough/chest congestion  SOB  Fever    According to the protocol, patient should be able to care for this at home.  Care advice given. Patient verbalizes understanding and agrees with plan of care.     Tami Jasso RN, Nurse Advisor 3:02 PM 8/2/2022  COVID 19 Nurse Triage Plan/Patient Instructions    Please be aware that novel coronavirus (COVID-19) may be circulating in the community. If you develop symptoms such as fever, cough, or SOB or if you have concerns about the presence of another infection including coronavirus (COVID-19), please contact your health care provider or visit [email or web address].     Disposition/Instructions    Home care recommended. Follow home care protocol based instructions.    Thank you for taking steps to prevent the spread of this virus.  o Limit your contact with others.  o Wear a simple mask to cover your cough.  o Wash your hands well and often.    Reason for Disposition    COVID-19 Home Isolation, questions about    Additional Information    Negative: SEVERE difficulty breathing (e.g., struggling for each breath, speaks in single words)    Negative: Difficult to awaken or acting confused (e.g., disoriented, slurred speech)    Negative: Bluish (or gray) lips or face now    Negative: Shock suspected (e.g., cold/pale/clammy skin, too weak to stand, low BP, rapid pulse)    Negative: Sounds like a life-threatening emergency to the triager    Negative: [1] Diagnosed or suspected COVID-19 AND [2] symptoms lasting 3 or more weeks    Negative: [1] COVID-19 exposure AND [2] no symptoms    Negative: COVID-19 vaccine reaction suspected (e.g., fever, headache, muscle aches) occurring 1 to 3 days after getting vaccine    Negative: COVID-19 vaccine, questions about    Negative: [1] Lives with  someone known to have influenza (flu test positive) AND [2] flu-like symptoms (e.g., cough, runny nose, sore throat, SOB; with or without fever)    Negative: [1] Adult with possible COVID-19 symptoms AND [2] triager concerned about severity of symptoms or other causes    Negative: COVID-19 and breastfeeding, questions about    Negative: SEVERE or constant chest pain or pressure  (Exception: Mild central chest pain, present only when coughing.)    Negative: MODERATE difficulty breathing (e.g., speaks in phrases, SOB even at rest, pulse 100-120)    Negative: Headache and stiff neck (can't touch chin to chest)    Negative: Oxygen level (e.g., pulse oximetry) 90 percent or lower    Negative: Chest pain or pressure    Negative: Patient sounds very sick or weak to the triager    Negative: MILD difficulty breathing (e.g., minimal/no SOB at rest, SOB with walking, pulse <100)    Negative: Fever > 103 F (39.4 C)    Negative: [1] Fever > 101 F (38.3 C) AND [2] over 60 years of age    Negative: [1] Fever > 100.0 F (37.8 C) AND [2] bedridden (e.g., nursing home patient, CVA, chronic illness, recovering from surgery)    Negative: HIGH RISK for severe COVID complications (e.g., weak immune system, age > 64 years, obesity with BMI > 25, pregnant, chronic lung disease or other chronic medical condition) (Exception: Already seen by PCP and no new or worsening symptoms.)    Negative: [1] HIGH RISK patient AND [2] influenza is widespread in the community AND [3] ONE OR MORE respiratory symptoms: cough, sore throat, runny or stuffy nose    Negative: [1] HIGH RISK patient AND [2] influenza exposure within the last 7 days AND [3] ONE OR MORE respiratory symptoms: cough, sore throat, runny or stuffy nose    Negative: Oxygen level (e.g., pulse oximetry) 91 to 94 percent    Negative: [1] COVID-19 infection suspected by caller or triager AND [2] mild symptoms (cough, fever, or others) AND [3] negative COVID-19 rapid test    Negative: Fever  present > 3 days (72 hours)    Negative: [1] Fever returns after gone for over 24 hours AND [2] symptoms worse or not improved    Negative: [1] Continuous (nonstop) coughing interferes with work or school AND [2] no improvement using cough treatment per Care Advice    Negative: Cough present > 3 weeks    Negative: [1] COVID-19 diagnosed by positive lab test (e.g., PCR, rapid self-test kit) AND [2] NO symptoms (e.g., cough, fever, others)    Negative: [1] COVID-19 diagnosed by positive lab test (e.g., PCR, rapid self-test kit) AND [2] mild symptoms (e.g., cough, fever, others) AND [3] no complications or SOB    Negative: [1] COVID-19 diagnosed by doctor (or NP/PA) AND [2] mild symptoms (e.g., cough, fever, others) AND [3] no complications or SOB    Negative: [1] COVID-19 diagnosed AND [2] has mild nausea, vomiting or diarrhea    Negative: [1] COVID-19 infection suspected by caller or triager AND [2] mild symptoms (cough, fever, or others) AND [3] has not gotten tested yet    Protocols used: CORONAVIRUS (COVID-19) DIAGNOSED OR [id number] 1.18.2022

## 2022-10-21 ENCOUNTER — OFFICE VISIT (OUTPATIENT)
Dept: FAMILY MEDICINE | Facility: CLINIC | Age: 42
End: 2022-10-21
Payer: COMMERCIAL

## 2022-10-21 VITALS
WEIGHT: 225 LBS | SYSTOLIC BLOOD PRESSURE: 126 MMHG | BODY MASS INDEX: 33.71 KG/M2 | HEART RATE: 76 BPM | DIASTOLIC BLOOD PRESSURE: 86 MMHG

## 2022-10-21 DIAGNOSIS — F33.1 MODERATE RECURRENT MAJOR DEPRESSION (H): Primary | ICD-10-CM

## 2022-10-21 PROBLEM — E66.9 OBESITY: Status: ACTIVE | Noted: 2022-10-21

## 2022-10-21 PROBLEM — Z67.91 RH NEGATIVE, ANTEPARTUM: Status: RESOLVED | Noted: 2020-04-02 | Resolved: 2022-10-21

## 2022-10-21 PROBLEM — O26.899 RH NEGATIVE, ANTEPARTUM: Status: RESOLVED | Noted: 2020-04-02 | Resolved: 2022-10-21

## 2022-10-21 PROCEDURE — 96127 BRIEF EMOTIONAL/BEHAV ASSMT: CPT | Performed by: PHYSICIAN ASSISTANT

## 2022-10-21 PROCEDURE — 99204 OFFICE O/P NEW MOD 45 MIN: CPT | Performed by: PHYSICIAN ASSISTANT

## 2022-10-21 RX ORDER — ESCITALOPRAM OXALATE 10 MG/1
10 TABLET ORAL DAILY
Qty: 30 TABLET | Refills: 0 | Status: SHIPPED | OUTPATIENT
Start: 2022-10-21 | End: 2022-10-24 | Stop reason: SINTOL

## 2022-10-21 RX ORDER — METAPROTERENOL SULFATE 10 MG
500 TABLET ORAL 2 TIMES DAILY
COMMUNITY

## 2022-10-21 ASSESSMENT — ANXIETY QUESTIONNAIRES
IF YOU CHECKED OFF ANY PROBLEMS ON THIS QUESTIONNAIRE, HOW DIFFICULT HAVE THESE PROBLEMS MADE IT FOR YOU TO DO YOUR WORK, TAKE CARE OF THINGS AT HOME, OR GET ALONG WITH OTHER PEOPLE: VERY DIFFICULT
GAD7 TOTAL SCORE: 17
2. NOT BEING ABLE TO STOP OR CONTROL WORRYING: NEARLY EVERY DAY
8. IF YOU CHECKED OFF ANY PROBLEMS, HOW DIFFICULT HAVE THESE MADE IT FOR YOU TO DO YOUR WORK, TAKE CARE OF THINGS AT HOME, OR GET ALONG WITH OTHER PEOPLE?: VERY DIFFICULT
GAD7 TOTAL SCORE: 17
5. BEING SO RESTLESS THAT IT IS HARD TO SIT STILL: NEARLY EVERY DAY
7. FEELING AFRAID AS IF SOMETHING AWFUL MIGHT HAPPEN: NEARLY EVERY DAY
7. FEELING AFRAID AS IF SOMETHING AWFUL MIGHT HAPPEN: NEARLY EVERY DAY
GAD7 TOTAL SCORE: 17
3. WORRYING TOO MUCH ABOUT DIFFERENT THINGS: SEVERAL DAYS
6. BECOMING EASILY ANNOYED OR IRRITABLE: SEVERAL DAYS
1. FEELING NERVOUS, ANXIOUS, OR ON EDGE: NEARLY EVERY DAY
4. TROUBLE RELAXING: NEARLY EVERY DAY

## 2022-10-21 ASSESSMENT — PATIENT HEALTH QUESTIONNAIRE - PHQ9
SUM OF ALL RESPONSES TO PHQ QUESTIONS 1-9: 22
10. IF YOU CHECKED OFF ANY PROBLEMS, HOW DIFFICULT HAVE THESE PROBLEMS MADE IT FOR YOU TO DO YOUR WORK, TAKE CARE OF THINGS AT HOME, OR GET ALONG WITH OTHER PEOPLE: VERY DIFFICULT
SUM OF ALL RESPONSES TO PHQ QUESTIONS 1-9: 22

## 2022-10-21 NOTE — PROGRESS NOTES
Assessment & Plan     Moderate recurrent major depression (H)  Worsening  - escitalopram (LEXAPRO) 10 MG tablet  Dispense: 30 tablet; Refill: 0 new medication discussed dosing and side effects we will see her back in about 3 weeks prior as needed               Depression Screening Follow Up    PHQ 10/21/2022   PHQ-9 Total Score 22   Q9: Thoughts of better off dead/self-harm past 2 weeks Not at all         Follow Up Actions Taken           No follow-ups on file.    AIDE Valdes  [facility]    Annemarie Manuel is a 42 year old, presenting for the following health issues:  Depression      42-year-old female presents to the clinic for evaluation for mood disorder in the past she has had some problems with postpartum depression she is done some counseling she has never been on any medications  She denies any homicidal or suicidal ideation  She is having situations at work some financial concerns housing concerns [place] the [place] 7 years but that is financially unfeasible and so they are going to be moving feels panicked at times she is tearful at times she is not sleeping well  She is trying to get her old job back and has an interview on Tuesday    History of Present Illness       Mental Health Follow-up:  Patient presents to follow-up on Depression & Anxiety.Patient's depression since last visit has been:  Worse  The patient is having other symptoms associated with depression.  Patient's anxiety since last visit has been:  Worse  The patient is having other symptoms associated with anxiety.  Any significant life events: relationship concerns, job concerns, financial concerns, housing concerns and grief or loss  Patient is feeling anxious or having panic attacks.  Patient has no concerns about alcohol or drug use.    She eats 0-1 servings of fruits and vegetables daily.She consumes 0 sweetened beverage(s) daily.She exercises with enough effort to  increase her heart rate 9 or less minutes per day.  She exercises with enough effort to increase her heart rate 3 or less days per week.   She is taking medications regularly.    Today's PHQ-9         PHQ-9 Total Score: 22    PHQ-9 Q9 Thoughts of better off dead/self-harm past 2 weeks :   Not at all    How difficult have these problems made it for you to do your work, take care of things at home, or get along with other people: Very difficult  Today's CRESENCIO-7 Score: 17           Review of Systems         Objective    There were no vitals taken for this visit.  There is no height or weight on file to calculate BMI.  Cardiovascular regular rate and rhythm lungs clear well ventilated she is dressed and groomed appropriately she is tearful but is able to compose herself quickly

## 2022-10-22 ENCOUNTER — MYC MEDICAL ADVICE (OUTPATIENT)
Dept: FAMILY MEDICINE | Facility: CLINIC | Age: 42
End: 2022-10-22

## 2022-10-22 DIAGNOSIS — F33.1 MODERATE RECURRENT MAJOR DEPRESSION (H): Primary | ICD-10-CM

## 2022-10-24 RX ORDER — DULOXETIN HYDROCHLORIDE 30 MG/1
30 CAPSULE, DELAYED RELEASE ORAL DAILY
Qty: 30 CAPSULE | Refills: 1 | Status: SHIPPED | OUTPATIENT
Start: 2022-10-24 | End: 2023-01-13

## 2022-10-24 NOTE — TELEPHONE ENCOUNTER
Patient updated regarding provider's recommendation/orders.    Jeff Beasley LPN on 10/24/2022 at 2:11 PM

## 2022-10-24 NOTE — TELEPHONE ENCOUNTER
I would hold the Lexapro. In two days, I would start Cymbalta 30 mg daily. See us in three weeks and PRN    KAH

## 2023-01-12 ASSESSMENT — ANXIETY QUESTIONNAIRES
5. BEING SO RESTLESS THAT IT IS HARD TO SIT STILL: SEVERAL DAYS
7. FEELING AFRAID AS IF SOMETHING AWFUL MIGHT HAPPEN: SEVERAL DAYS
GAD7 TOTAL SCORE: 10
1. FEELING NERVOUS, ANXIOUS, OR ON EDGE: MORE THAN HALF THE DAYS
4. TROUBLE RELAXING: MORE THAN HALF THE DAYS
GAD7 TOTAL SCORE: 10
2. NOT BEING ABLE TO STOP OR CONTROL WORRYING: MORE THAN HALF THE DAYS
6. BECOMING EASILY ANNOYED OR IRRITABLE: NOT AT ALL
3. WORRYING TOO MUCH ABOUT DIFFERENT THINGS: MORE THAN HALF THE DAYS
7. FEELING AFRAID AS IF SOMETHING AWFUL MIGHT HAPPEN: SEVERAL DAYS
IF YOU CHECKED OFF ANY PROBLEMS ON THIS QUESTIONNAIRE, HOW DIFFICULT HAVE THESE PROBLEMS MADE IT FOR YOU TO DO YOUR WORK, TAKE CARE OF THINGS AT HOME, OR GET ALONG WITH OTHER PEOPLE: SOMEWHAT DIFFICULT
GAD7 TOTAL SCORE: 10
8. IF YOU CHECKED OFF ANY PROBLEMS, HOW DIFFICULT HAVE THESE MADE IT FOR YOU TO DO YOUR WORK, TAKE CARE OF THINGS AT HOME, OR GET ALONG WITH OTHER PEOPLE?: SOMEWHAT DIFFICULT

## 2023-01-12 ASSESSMENT — PATIENT HEALTH QUESTIONNAIRE - PHQ9
SUM OF ALL RESPONSES TO PHQ QUESTIONS 1-9: 18
SUM OF ALL RESPONSES TO PHQ QUESTIONS 1-9: 18
10. IF YOU CHECKED OFF ANY PROBLEMS, HOW DIFFICULT HAVE THESE PROBLEMS MADE IT FOR YOU TO DO YOUR WORK, TAKE CARE OF THINGS AT HOME, OR GET ALONG WITH OTHER PEOPLE: SOMEWHAT DIFFICULT

## 2023-01-13 ENCOUNTER — VIRTUAL VISIT (OUTPATIENT)
Dept: FAMILY MEDICINE | Facility: CLINIC | Age: 43
End: 2023-01-13
Payer: COMMERCIAL

## 2023-01-13 DIAGNOSIS — F33.1 MODERATE RECURRENT MAJOR DEPRESSION (H): Primary | ICD-10-CM

## 2023-01-13 PROCEDURE — 99213 OFFICE O/P EST LOW 20 MIN: CPT | Mod: 95 | Performed by: PHYSICIAN ASSISTANT

## 2023-01-13 RX ORDER — MULTIVITAMIN
TABLET ORAL
COMMUNITY

## 2023-01-13 RX ORDER — ESCITALOPRAM OXALATE 10 MG/1
5 TABLET ORAL
COMMUNITY
Start: 2023-01-02 | End: 2023-02-03

## 2023-01-13 ASSESSMENT — PATIENT HEALTH QUESTIONNAIRE - PHQ9
SUM OF ALL RESPONSES TO PHQ QUESTIONS 1-9: 18
10. IF YOU CHECKED OFF ANY PROBLEMS, HOW DIFFICULT HAVE THESE PROBLEMS MADE IT FOR YOU TO DO YOUR WORK, TAKE CARE OF THINGS AT HOME, OR GET ALONG WITH OTHER PEOPLE: SOMEWHAT DIFFICULT

## 2023-01-13 ASSESSMENT — ENCOUNTER SYMPTOMS
CONSTITUTIONAL NEGATIVE: 1
SLEEP DISTURBANCE: 1
NERVOUS/ANXIOUS: 1

## 2023-01-13 ASSESSMENT — ANXIETY QUESTIONNAIRES: GAD7 TOTAL SCORE: 10

## 2023-01-13 NOTE — PROGRESS NOTES
"Mar is a 42 year old who is being evaluated via a billable video visit.      How would you like to obtain your AVS? MyChart  If the video visit is dropped, the invitation should be resent by: Text to cell phone: [phone]  Will anyone else be joining your video visit? No          Assessment & Plan     Moderate recurrent major depression (H)  We will stay for 2 full weeks on the Lexapro 5 mg once daily and then increase to 10 mg 1 to touch base a couple weeks after she gets to the 10 mg and see how that is going she may just the timing of when she takes her medication if she prefers to take it in the morning and see if that is better tolerated I think the counseling is a good idea I did offer her psychiatry as well she declines at this time               Depression Screening Follow Up    PHQ 1/12/2023   PHQ-9 Total Score 18   Q9: Thoughts of better off dead/self-harm past 2 weeks Not at all         Follow Up Actions Taken           No follow-ups on file.    AIDE Valdes  [facility]    Subjective   Mar is a 42 year old accompanied by her self, presenting for the following health issues:  Depression ([employer].  Pt states she is taking a 1/2 tab(5mg) of lexapro daily.  Pt wants to discuss \"side effects\"  pt also requesting referral to psychology/phychiatry)      42-year-old female presents to the clinic for evaluation for mood disorder  She has restarted the Escitalopram but started at 5 mg once daily she still has had some tolerability issues mainly feeling anxious and having some insomnia she had 1 night this week that she slept really well and felt great the next day but unfortunate that is been the exception rather than the rule denies any thoughts of self-harm  She was able to secure her old place of employment and that is going well she is going to be starting some counseling which is available through her work had some questions about the medication and when she should " try to escalate to the 10 mg dose    History of Present Illness       Mental Health Follow-up:  Patient presents to follow-up on Depression & Anxiety.Patient's depression since last visit has been:  Medium  The patient is not having other symptoms associated with depression.  Patient's anxiety since last visit has been:  Medium  The patient is having other symptoms associated with anxiety.  Any significant life events: job concerns and health concerns  Patient is feeling anxious or having panic attacks.  Patient has no concerns about alcohol or drug use.    She eats 0-1 servings of fruits and vegetables daily.She consumes 0 sweetened beverage(s) daily.She exercises with enough effort to increase her heart rate 9 or less minutes per day.  She exercises with enough effort to increase her heart rate 3 or less days per week.   She is taking medications regularly.    Today's PHQ-9         PHQ-9 Total Score: 18    PHQ-9 Q9 Thoughts of better off dead/self-harm past 2 weeks :   Not at all    How difficult have these problems made it for you to do your work, take care of things at home, or get along with other people: Somewhat difficult  Today's CRESENCIO-7 Score: 10             Review of Systems   Constitutional: Negative.    Psychiatric/Behavioral: Positive for mood changes and sleep disturbance. Negative for self-injury and suicidal ideas. The patient is nervous/anxious.             Objective           Vitals:  No vitals were obtained today due to virtual visit.    Physical Exam   GENERAL: Healthy, alert and no distress  EYES: Eyes grossly normal to inspection.  No discharge or erythema, or obvious scleral/conjunctival abnormalities.  RESP: No audible wheeze, cough, or visible cyanosis.  No visible retractions or increased work of breathing.    SKIN: Visible skin clear. No significant rash, abnormal pigmentation or lesions.  NEURO: Cranial nerves grossly intact.  Mentation and speech appropriate for age.  PSYCH: Mentation  appears normal, affect normal/bright, judgement and insight intact, normal speech and appearance well-groomed.                Video-Visit Details    Type of service:  Video Visit     Originating Location (pt. Location): Home    Distant Location (provider location):  On-site  Platform used for Video Visit: [employer]

## 2023-02-03 ENCOUNTER — MYC MEDICAL ADVICE (OUTPATIENT)
Dept: FAMILY MEDICINE | Facility: CLINIC | Age: 43
End: 2023-02-03
Payer: COMMERCIAL

## 2023-02-03 DIAGNOSIS — F33.1 MODERATE RECURRENT MAJOR DEPRESSION (H): Primary | ICD-10-CM

## 2023-02-03 RX ORDER — ESCITALOPRAM OXALATE 10 MG/1
5 TABLET ORAL DAILY
Qty: 30 TABLET | Refills: 0 | Status: SHIPPED | OUTPATIENT
Start: 2023-02-03 | End: 2023-03-01

## 2023-02-03 NOTE — TELEPHONE ENCOUNTER
Prescription approved per [facility] Refill Protocol.  Madeleine DUTTA RN  [facility]

## 2023-02-08 ENCOUNTER — OFFICE VISIT (OUTPATIENT)
Dept: FAMILY MEDICINE | Facility: CLINIC | Age: 43
End: 2023-02-08
Payer: COMMERCIAL

## 2023-02-08 VITALS
BODY MASS INDEX: 31.99 KG/M2 | WEIGHT: 216 LBS | SYSTOLIC BLOOD PRESSURE: 110 MMHG | HEIGHT: 69 IN | TEMPERATURE: 97 F | HEART RATE: 88 BPM | RESPIRATION RATE: 20 BRPM | DIASTOLIC BLOOD PRESSURE: 68 MMHG

## 2023-02-08 DIAGNOSIS — R50.9 FEVER, UNSPECIFIED FEVER CAUSE: ICD-10-CM

## 2023-02-08 DIAGNOSIS — J02.9 PHARYNGITIS, UNSPECIFIED ETIOLOGY: ICD-10-CM

## 2023-02-08 DIAGNOSIS — J02.0 STREP THROAT: Primary | ICD-10-CM

## 2023-02-08 LAB
DEPRECATED S PYO AG THROAT QL EIA: POSITIVE
FLUAV AG SPEC QL IA: NEGATIVE
FLUBV AG SPEC QL IA: NEGATIVE
SARS-COV-2 RNA RESP QL NAA+PROBE: NEGATIVE

## 2023-02-08 PROCEDURE — 99213 OFFICE O/P EST LOW 20 MIN: CPT | Mod: CS | Performed by: PHYSICIAN ASSISTANT

## 2023-02-08 PROCEDURE — U0003 INFECTIOUS AGENT DETECTION BY NUCLEIC ACID (DNA OR RNA); SEVERE ACUTE RESPIRATORY SYNDROME CORONAVIRUS 2 (SARS-COV-2) (CORONAVIRUS DISEASE [COVID-19]), AMPLIFIED PROBE TECHNIQUE, MAKING USE OF HIGH THROUGHPUT TECHNOLOGIES AS DESCRIBED BY CMS-2020-01-R: HCPCS | Performed by: PHYSICIAN ASSISTANT

## 2023-02-08 PROCEDURE — U0005 INFEC AGEN DETEC AMPLI PROBE: HCPCS | Performed by: PHYSICIAN ASSISTANT

## 2023-02-08 PROCEDURE — 87880 STREP A ASSAY W/OPTIC: CPT | Mod: QW | Performed by: PHYSICIAN ASSISTANT

## 2023-02-08 PROCEDURE — 87804 INFLUENZA ASSAY W/OPTIC: CPT | Mod: QW | Performed by: PHYSICIAN ASSISTANT

## 2023-02-08 RX ORDER — AMOXICILLIN 875 MG
875 TABLET ORAL 2 TIMES DAILY
Qty: 20 TABLET | Refills: 0 | Status: SHIPPED | OUTPATIENT
Start: 2023-02-08 | End: 2023-02-18

## 2023-02-08 ASSESSMENT — ENCOUNTER SYMPTOMS: SORE THROAT: 1

## 2023-02-08 NOTE — PROGRESS NOTES
Assessment & Plan     Strep throat  Patient was given amoxicillin as ordered.  Push fluids, rest and ibuprofen or tylenol for comfort.    RTC for persistent or worsening sx.     - amoxicillin (AMOXIL) 875 MG tablet  Dispense: 20 tablet; Refill: 0    Pharyngitis    - Streptococcus A Rapid Screen w/Reflex to PCR - Clinic Collect  - Influenza A & B Antigen - Clinic Collect  - Symptomatic COVID-19 Virus (Coronavirus) by PCR Nose    Fever    - Streptococcus A Rapid Screen w/Reflex to PCR - Clinic Collect  - Influenza A & B Antigen - Clinic Collect  - Symptomatic COVID-19 Virus (Coronavirus) by PCR Nose       EARL Aguirre [facility]    Annemarie Manuel is a 42 year old female who presents to clinic today for the following health issues:  Chief Complaint   Patient presents with     Pharyngitis     Pt c/o ST,body aches,headaches,fatigue,fever and some nausea x 3 days.  Pt had negative covid test yesterday     Pharyngitis     History of Present Illness       Reason for visit:  Strep test and cold or flu  Symptom onset:  1-3 days ago  Symptoms include:  Fever body aches headaches fatigue supee sore throat  Symptom intensity:  Severe  Symptom progression:  Improving  Had these symptoms before:  Yes  Has tried/received treatment for these symptoms:  No  What makes it worse:  Not sure  What makes it better:  Tylonol    She eats 2-3 servings of fruits and vegetables daily.She consumes 0 sweetened beverage(s) daily.She exercises with enough effort to increase her heart rate 9 or less minutes per day.  She exercises with enough effort to increase her heart rate 3 or less days per week.   She is taking medications regularly.      Drinking water and soft food.  Saliva painful but able.    Fevers 100.7 at home.    No vomiting, diarrhea.    No rhinorrhea, congestion, cough.   Exposures: none         Review of Systems   HENT: Positive for sore throat.      Constitutional, HEENT,  "cardiovascular, pulmonary, gi and gu systems are negative, except as otherwise noted.      Objective    /68 (BP Location: Right arm, Patient Position: Sitting, Cuff Size: Adult Large)   Pulse 88   Temp 97  F (36.1  C) (Tympanic)   Resp 20   Ht 1.74 m (5' 8.5\")   Wt 98 kg (216 lb)   BMI 32.36 kg/m    Physical Exam   Pt is in no acute distress and appears well  Ears patent B:  TM s intact, non-injected. All land marks easily visibile    Nasal mucosa is non-edematous, no discharge.    Pharynx: brightly erythematous, tonsils 2+ hypertrophied, with exudate   Neck supple: tender anterior cervical adenopathy  Lungs: CTA  Heart: RRR, no murmur, no thrills or heaves   Ext: no edema  Skin: no rashes        Results for orders placed or performed in visit on 02/08/23   Streptococcus A Rapid Screen w/Reflex to PCR - Clinic Collect     Status: Abnormal    Specimen: Throat; Swab   Result Value Ref Range    Group A Strep antigen Positive (A) Negative   Influenza A & B Antigen - Clinic Collect     Status: Normal    Specimen: Nasopharyngeal; Swab   Result Value Ref Range    Influenza A antigen Negative Negative    Influenza B antigen Negative Negative    Narrative    Test results must be correlated with clinical data. If necessary, results should be confirmed by a molecular assay or viral culture.             "

## 2023-02-09 ENCOUNTER — TELEPHONE (OUTPATIENT)
Dept: URGENT CARE | Facility: URGENT CARE | Age: 43
End: 2023-02-09
Payer: COMMERCIAL

## 2023-02-09 NOTE — TELEPHONE ENCOUNTER
2-9-23  Pt called stated she had an appt w/ Yaquelin 2-8-23 & thinks she left her gloves in the waiting room.  Pt wondering if [facility] has these in the lost & found, they are black gloves  yesenia

## 2023-03-01 DIAGNOSIS — F33.1 MODERATE RECURRENT MAJOR DEPRESSION (H): ICD-10-CM

## 2023-03-01 RX ORDER — ESCITALOPRAM OXALATE 10 MG/1
10 TABLET ORAL DAILY
Qty: 30 TABLET | Refills: 1 | Status: SHIPPED | OUTPATIENT
Start: 2023-03-01 | End: 2023-05-03

## 2023-03-01 RX ORDER — ESCITALOPRAM OXALATE 10 MG/1
5 TABLET ORAL DAILY
Qty: 30 TABLET | Refills: 0 | Status: SHIPPED | OUTPATIENT
Start: 2023-03-01 | End: 2023-03-01

## 2023-03-06 NOTE — TELEPHONE ENCOUNTER
See MyChart from patient needing PCP review.      Madeleine Iqbal RN  [facility]

## 2023-05-03 ENCOUNTER — MYC MEDICAL ADVICE (OUTPATIENT)
Dept: FAMILY MEDICINE | Facility: CLINIC | Age: 43
End: 2023-05-03
Payer: COMMERCIAL

## 2023-05-03 DIAGNOSIS — F33.1 MODERATE RECURRENT MAJOR DEPRESSION (H): ICD-10-CM

## 2023-05-03 RX ORDER — ESCITALOPRAM OXALATE 10 MG/1
10 TABLET ORAL DAILY
Qty: 30 TABLET | Refills: 1 | Status: SHIPPED | OUTPATIENT
Start: 2023-05-03 | End: 2023-06-10

## 2023-05-03 NOTE — TELEPHONE ENCOUNTER
Routing refill request to provider for review/approval because:  LOV 1/13/2023    Drug interaction warning - Allergy to escitalopram     SSRIs Protocol Failed     PHQ-9 score less than 5 in past 6 months         PHQ-2 Score:         1/12/2023    11:35 AM 10/21/2022     6:15 AM   PHQ-2 ( 1999 Pfizer)   PHQ-2 Score Incomplete Incomplete      AIMEE Mendez  [facility]

## 2023-06-08 DIAGNOSIS — F33.1 MODERATE RECURRENT MAJOR DEPRESSION (H): ICD-10-CM

## 2023-06-09 NOTE — TELEPHONE ENCOUNTER
"    Last office visit: 2/8/2023     Future Appointments 6/9/2023 - 12/6/2023    None        PHQ-9 score:      1/12/2023    11:35 AM   PHQ   PHQ-9 Total Score 18   Q9: Thoughts of better off dead/self-harm past 2 weeks Not at all             Requested Prescriptions   Pending Prescriptions Disp Refills     escitalopram (LEXAPRO) 10 MG tablet [Pharmacy Med Name: ESCITALOPRAM OXALATE 10MG TABLET] 30 tablet 1     Sig: TAKE ONE TABLET (10 MG) BY MOUTH DAILY       SSRIs Protocol Failed - 6/8/2023  4:36 PM        Failed - PHQ-9 score less than 5 in past 6 months     Please review last PHQ-9 score.           Passed - Medication is active on med list        Passed - Patient is age 18 or older        Passed - No active pregnancy on record        Passed - No positive pregnancy test in last 12 months        Passed - Recent (6 mo) or future (30 days) visit within the authorizing provider's specialty     Patient had office visit in the last 6 months or has a visit in the next 30 days with authorizing provider or within the authorizing provider's specialty.  See \"Patient Info\" tab in inbasket, or \"Choose Columns\" in Meds & Orders section of the refill encounter.                       "

## 2023-06-10 RX ORDER — ESCITALOPRAM OXALATE 10 MG/1
TABLET ORAL
Qty: 30 TABLET | Refills: 1 | Status: SHIPPED | OUTPATIENT
Start: 2023-06-10 | End: 2023-07-06

## 2023-07-06 ENCOUNTER — MYC MEDICAL ADVICE (OUTPATIENT)
Dept: FAMILY MEDICINE | Facility: CLINIC | Age: 43
End: 2023-07-06
Payer: COMMERCIAL

## 2023-07-06 DIAGNOSIS — F33.1 MODERATE RECURRENT MAJOR DEPRESSION (H): ICD-10-CM

## 2023-07-06 RX ORDER — ESCITALOPRAM OXALATE 10 MG/1
TABLET ORAL
Qty: 30 TABLET | Refills: 1 | Status: SHIPPED | OUTPATIENT
Start: 2023-07-06 | End: 2023-09-07

## 2023-07-06 NOTE — TELEPHONE ENCOUNTER
Prescription approved per [facility] Refill Protocol.    Last Written Prescription Date:  6/10/23  Last Fill Quantity: 30,  # refills: 1   Last office visit: 2/8/2023 ; last virtual visit: 1/13/2023 with prescribing provider

## 2023-07-15 ENCOUNTER — HEALTH MAINTENANCE LETTER (OUTPATIENT)
Age: 43
End: 2023-07-15

## 2023-07-15 ENCOUNTER — OFFICE VISIT (OUTPATIENT)
Dept: URGENT CARE | Facility: URGENT CARE | Age: 43
End: 2023-07-15
Payer: COMMERCIAL

## 2023-07-15 ENCOUNTER — NURSE TRIAGE (OUTPATIENT)
Dept: NURSING | Facility: CLINIC | Age: 43
End: 2023-07-15
Payer: COMMERCIAL

## 2023-07-15 VITALS
WEIGHT: 234.6 LBS | DIASTOLIC BLOOD PRESSURE: 84 MMHG | OXYGEN SATURATION: 98 % | SYSTOLIC BLOOD PRESSURE: 121 MMHG | HEART RATE: 80 BPM | TEMPERATURE: 98 F | BODY MASS INDEX: 35.15 KG/M2

## 2023-07-15 DIAGNOSIS — R07.0 THROAT PAIN: ICD-10-CM

## 2023-07-15 DIAGNOSIS — J02.9 VIRAL PHARYNGITIS: Primary | ICD-10-CM

## 2023-07-15 LAB
DEPRECATED S PYO AG THROAT QL EIA: NEGATIVE
GROUP A STREP BY PCR: NOT DETECTED

## 2023-07-15 PROCEDURE — 87651 STREP A DNA AMP PROBE: CPT | Performed by: FAMILY MEDICINE

## 2023-07-15 PROCEDURE — 99213 OFFICE O/P EST LOW 20 MIN: CPT | Performed by: FAMILY MEDICINE

## 2023-07-15 NOTE — TELEPHONE ENCOUNTER
Pt calling with sore throat and last time it felt this way it was strep.  Pt wanting to be seen for throat culture.  Pt intends to go to [facility] [place] [place] now.  Bibi Brooks RN  [facility] Nurse Advisor    Reason for Disposition    SEVERE (e.g., excruciating) throat pain    Additional Information    Negative: SEVERE difficulty breathing (e.g., struggling for each breath, speaks in single words, stridor)    Negative: Sounds like a life-threatening emergency to the triager    Negative: [1] Diagnosed strep throat AND [2] taking antibiotic AND [3] symptoms continue    Negative: Throat culture results, call about    Negative: Productive cough is main symptom    Negative: Non-productive cough is main symptom    Negative: Hoarseness is main symptom    Negative: Runny nose is main symptom    Negative: Uvula swelling is main symptom    Negative: [1] Drooling or spitting out saliva (because can't swallow) AND [2] normal breathing    Negative: Unable to open mouth completely    Negative: [1] Difficulty breathing AND [2] not severe    Negative: Fever > 104 F (40 C)    Negative: [1] Refuses to drink anything AND [2] for > 12 hours    Negative: [1] Drinking very little AND [2] dehydration suspected (e.g., no urine > 12 hours, very dry mouth, very lightheaded)    Negative: Patient sounds very sick or weak to the triager    Protocols used: SORE THROAT-A-AH

## 2023-07-15 NOTE — PROGRESS NOTES
Clinical Decision Making:    At the end of the encounter, I discussed results, diagnosis, medications. Discussed red flags for immediate return to clinic/ER, as well as indications for follow up if no improvement. Patient understood and agreed to plan. Patient was stable for discharge.      ICD-10-CM    1. Viral pharyngitis  J02.9       2. Throat pain  R07.0 Streptococcus A Rapid Screen w/Reflex to PCR - Clinic Collect     Group A Streptococcus PCR Throat Swab        Strep PCR pending  Fluids  Acetaminophen and ibuprofen as needed  Throat lozenges  Cold foods  Patient verbalizes understanding      There are no Patient Instructions on file for this visit.   No follow-ups on file.      chief complaint    HPI:  Mar Kaur is a 43 year old female who presents today complaining of sore throat for a couple of days.  It is worsening and feels quite sore today.  She had strep a couple of months ago and this feels similar.  She does have a headache off and on.  Decreased sleep last night and starting to have some trouble swallowing due to the pain.  She denies fevers, chills, myalgias, nasal congestion, cough and nausea or abdominal pain.    History obtained from the patient.    Problem List:  2022-10: Obesity  2020-04: Lactating mother  2020-04: Encounter for other contraceptive management  2020-04: Rh negative, antepartum  2020-02: Labor, precipitous, delivered  2020-02: Post-term pregnancy, 40-42 weeks of gestation  2019-07: Supervision of high-risk pregnancy of elderly multigravida  2019-07: Multigravida of advanced maternal age in first trimester  2019-07: Rh negative state in antepartum period  2016-02: Pregnant  Cervical cancer screening  Condyloma acuminatum      Past Medical History:   Diagnosis Date     Breast disorder     Had benign breast lump removed 2008     Genital warts      Infection due to 2019 novel coronavirus 08/2022     Postpartum depression      Rh incompatibility      Trauma      Varicella      childhood       Social History     Tobacco Use     Smoking status: Former     Packs/day: 0.25     Years: 2.00     Pack years: 0.50     Types: Cigarettes     Quit date: 2000     Years since quittin.3     Smokeless tobacco: Never   Substance Use Topics     Alcohol use: No       Review of systems  negative except listed in HPI    Vitals:    07/15/23 0921   BP: 121/84   Pulse: 80   Temp: 98  F (36.7  C)   TempSrc: Oral   SpO2: 98%   Weight: 106.4 kg (234 lb 9.6 oz)       Physical Exam  Vitals noted and within normal limits.  Patient is alert, oriented, and in no acute distress.  Eyes: Conjunctive not injected.  Ears: Canals patent, TMs intact, no erythema and no bulging.  Mouth: Mucous membranes pink and moist.  Pharynx is mildly erythematous with no exudate.  Neck supple with no cervical lymphadenopathy.  Heart has a regular rate and rhythm with no murmurs.  Lungs are clear to auscultation bilaterally with good air entry.  No wheezes, rales, rhonchi.  Rapid strep test is negative  Results for orders placed or performed in visit on 07/15/23   Streptococcus A Rapid Screen w/Reflex to PCR - Clinic Collect     Status: Normal    Specimen: Throat; Swab   Result Value Ref Range    Group A Strep antigen Negative Negative

## 2023-09-06 ENCOUNTER — MYC MEDICAL ADVICE (OUTPATIENT)
Dept: FAMILY MEDICINE | Facility: CLINIC | Age: 43
End: 2023-09-06
Payer: COMMERCIAL

## 2023-09-06 DIAGNOSIS — F33.1 MODERATE RECURRENT MAJOR DEPRESSION (H): ICD-10-CM

## 2023-09-07 RX ORDER — ESCITALOPRAM OXALATE 10 MG/1
TABLET ORAL
Qty: 30 TABLET | Refills: 0 | Status: SHIPPED | OUTPATIENT
Start: 2023-09-07 | End: 2023-10-06

## 2023-09-07 NOTE — TELEPHONE ENCOUNTER
Prescription approved per [facility] Refill Protocol.  Courtney refill; needs visit  Last Written Prescription Date: 7/6/23  Last Fill Quantity: 30,  # refills: 1   Last office visit: 2/8/2023 ; last virtual visit: 1/13/2023 with prescribing provider

## 2023-10-05 RX ORDER — ESCITALOPRAM OXALATE 10 MG/1
TABLET ORAL
Qty: 90 TABLET | Refills: 3 | Status: CANCELLED | OUTPATIENT
Start: 2023-10-05

## 2023-10-05 ASSESSMENT — ANXIETY QUESTIONNAIRES
GAD7 TOTAL SCORE: 1
4. TROUBLE RELAXING: NOT AT ALL
2. NOT BEING ABLE TO STOP OR CONTROL WORRYING: NOT AT ALL
7. FEELING AFRAID AS IF SOMETHING AWFUL MIGHT HAPPEN: NOT AT ALL
6. BECOMING EASILY ANNOYED OR IRRITABLE: SEVERAL DAYS
GAD7 TOTAL SCORE: 1
IF YOU CHECKED OFF ANY PROBLEMS ON THIS QUESTIONNAIRE, HOW DIFFICULT HAVE THESE PROBLEMS MADE IT FOR YOU TO DO YOUR WORK, TAKE CARE OF THINGS AT HOME, OR GET ALONG WITH OTHER PEOPLE: NOT DIFFICULT AT ALL
1. FEELING NERVOUS, ANXIOUS, OR ON EDGE: NOT AT ALL
5. BEING SO RESTLESS THAT IT IS HARD TO SIT STILL: NOT AT ALL
3. WORRYING TOO MUCH ABOUT DIFFERENT THINGS: NOT AT ALL

## 2023-10-05 ASSESSMENT — PATIENT HEALTH QUESTIONNAIRE - PHQ9
SUM OF ALL RESPONSES TO PHQ QUESTIONS 1-9: 4
10. IF YOU CHECKED OFF ANY PROBLEMS, HOW DIFFICULT HAVE THESE PROBLEMS MADE IT FOR YOU TO DO YOUR WORK, TAKE CARE OF THINGS AT HOME, OR GET ALONG WITH OTHER PEOPLE: NOT DIFFICULT AT ALL
SUM OF ALL RESPONSES TO PHQ QUESTIONS 1-9: 4

## 2023-10-06 ENCOUNTER — VIRTUAL VISIT (OUTPATIENT)
Dept: FAMILY MEDICINE | Facility: CLINIC | Age: 43
End: 2023-10-06
Payer: COMMERCIAL

## 2023-10-06 DIAGNOSIS — F33.1 MODERATE RECURRENT MAJOR DEPRESSION (H): ICD-10-CM

## 2023-10-06 PROCEDURE — 99213 OFFICE O/P EST LOW 20 MIN: CPT | Mod: VID | Performed by: FAMILY MEDICINE

## 2023-10-06 RX ORDER — ESCITALOPRAM OXALATE 5 MG/1
TABLET ORAL
Qty: 30 TABLET | Refills: 3 | Status: SHIPPED | OUTPATIENT
Start: 2023-10-06

## 2023-10-06 NOTE — PROGRESS NOTES
"Mar is a 43 year old who is being evaluated via a billable video visit.      How would you like to obtain your AVS? MyChart  If the video visit is dropped, the invitation should be resent by: Text to cell phone: [phone]  Will anyone else be joining your video visit? No          Assessment & Plan     Moderate recurrent major depression (H)  Very well we discussed a slow taper over the next 1 to 2 months call for any issues  - escitalopram (LEXAPRO) 5 MG tablet; TAKE ONE TABLET (10 MG) BY MOUTH DAILY             BMI:   Estimated body mass index is 35.15 kg/m  as calculated from the following:    Height as of 2/8/23: 1.74 m (5' 8.5\").    Weight as of 7/15/23: 106.4 kg (234 lb 9.6 oz).           Josh Johnson MD  [facility]    Subjective   Mar is a 43 year old, presenting for the following health issues: Overall doing great everything settled down in her life, she like to discuss tapering Lexapro  Depression (Discuss tapering off escitalopram.)      10/6/2023     8:29 AM   Additional Questions   Roomed by Fabio       History of Present Illness       Mental Health Follow-up:  Patient presents to follow-up on Depression & Anxiety.Patient's depression since last visit has been:  Good  The patient is not having other symptoms associated with depression.  Patient's anxiety since last visit has been:  Good  The patient is not having other symptoms associated with anxiety.  Any significant life events: No  Patient is not feeling anxious or having panic attacks.  Patient has no concerns about alcohol or drug use.    She eats 2-3 servings of fruits and vegetables daily.She consumes 0 sweetened beverage(s) daily.She exercises with enough effort to increase her heart rate 9 or less minutes per day.  She exercises with enough effort to increase her heart rate 3 or less days per week.   She is taking medications regularly.       Social History     Tobacco Use    Smoking status: Former     " Packs/day: 0.25     Years: 2.00     Pack years: 0.50     Types: Cigarettes     Quit date: 2000     Years since quittin.6    Smokeless tobacco: Never   Vaping Use    Vaping Use: Never used   Substance Use Topics    Alcohol use: No    Drug use: No         10/21/2022     6:12 AM 2023    11:35 AM 10/5/2023     5:08 PM   PHQ   PHQ-9 Total Score 22 18 4   Q9: Thoughts of better off dead/self-harm past 2 weeks Not at all Not at all Not at all         10/21/2022     6:15 AM 2023    11:35 AM 10/5/2023     5:10 PM   CRESENCIO-7 SCORE   Total Score 17 (severe anxiety) 10 (moderate anxiety) 1 (minimal anxiety)   Total Score 17 10 1         Suicide Assessment Five-step Evaluation and Treatment (SAFE-T)          Review of Systems         Objective           Vitals:  No vitals were obtained today due to virtual visit.    Physical Exam   GENERAL: Healthy, alert and no distress  EYES: Eyes grossly normal to inspection.  No discharge or erythema, or obvious scleral/conjunctival abnormalities.  RESP: No audible wheeze, cough, or visible cyanosis.  No visible retractions or increased work of breathing.    SKIN: Visible skin clear. No significant rash, abnormal pigmentation or lesions.  NEURO: Cranial nerves grossly intact.  Mentation and speech appropriate for age.  PSYCH: Mentation appears normal, affect normal/bright, judgement and insight intact, normal speech and appearance well-groomed.                Video-Visit Details    Type of service:  Video Visit     Originating Location (pt. Location): Home    Distant Location (provider location):  On-site  Platform used for Video Visit: [employer]

## 2024-02-10 ENCOUNTER — HEALTH MAINTENANCE LETTER (OUTPATIENT)
Age: 44
End: 2024-02-10

## 2024-05-29 ENCOUNTER — OFFICE VISIT (OUTPATIENT)
Dept: FAMILY MEDICINE | Facility: CLINIC | Age: 44
End: 2024-05-29
Payer: COMMERCIAL

## 2024-05-29 ENCOUNTER — TELEPHONE (OUTPATIENT)
Dept: FAMILY MEDICINE | Facility: CLINIC | Age: 44
End: 2024-05-29

## 2024-05-29 VITALS
RESPIRATION RATE: 18 BRPM | WEIGHT: 254.3 LBS | HEART RATE: 107 BPM | SYSTOLIC BLOOD PRESSURE: 125 MMHG | TEMPERATURE: 100.2 F | DIASTOLIC BLOOD PRESSURE: 86 MMHG | BODY MASS INDEX: 37.67 KG/M2 | HEIGHT: 69 IN | OXYGEN SATURATION: 98 %

## 2024-05-29 DIAGNOSIS — J02.9 SORE THROAT: Primary | ICD-10-CM

## 2024-05-29 DIAGNOSIS — J02.0 STREP THROAT: ICD-10-CM

## 2024-05-29 LAB — DEPRECATED S PYO AG THROAT QL EIA: POSITIVE

## 2024-05-29 PROCEDURE — 99213 OFFICE O/P EST LOW 20 MIN: CPT | Performed by: PHYSICIAN ASSISTANT

## 2024-05-29 PROCEDURE — 87880 STREP A ASSAY W/OPTIC: CPT | Mod: QW | Performed by: PHYSICIAN ASSISTANT

## 2024-05-29 RX ORDER — PENICILLIN V POTASSIUM 500 MG/1
500 TABLET, FILM COATED ORAL 2 TIMES DAILY
Qty: 20 TABLET | Refills: 0 | Status: SHIPPED | OUTPATIENT
Start: 2024-05-29 | End: 2024-06-08

## 2024-05-29 ASSESSMENT — PATIENT HEALTH QUESTIONNAIRE - PHQ9
SUM OF ALL RESPONSES TO PHQ QUESTIONS 1-9: 0
SUM OF ALL RESPONSES TO PHQ QUESTIONS 1-9: 0
10. IF YOU CHECKED OFF ANY PROBLEMS, HOW DIFFICULT HAVE THESE PROBLEMS MADE IT FOR YOU TO DO YOUR WORK, TAKE CARE OF THINGS AT HOME, OR GET ALONG WITH OTHER PEOPLE: NOT DIFFICULT AT ALL

## 2024-05-29 ASSESSMENT — ENCOUNTER SYMPTOMS: SORE THROAT: 1

## 2024-05-29 NOTE — PROGRESS NOTES
"Assessment & Plan     Sore throat    - Streptococcus A Rapid Screen w/Reflex to PCR - Clinic Collect    Strep throat  Penicillin as ordered.     Push fluids, rest and ibuprofen or tylenol for comfort.    RTC for persistent or worsening sx.     - penicillin V (VEETID) 500 MG tablet  Dispense: 20 tablet; Refill: 0         EARL Aguirre [facility]    Annemarie Manuel is a 43 year old female who presents to clinic today for the following health issues:  Chief Complaint   Patient presents with    Pharyngitis     No known exposure.... chills,fatigue, ST  started yesterday     History of Present Illness       Reason for visit:  Ill strep throat?  Symptom onset:  1-3 days ago    She eats 2-3 servings of fruits and vegetables daily.She consumes 0 sweetened beverage(s) daily.She exercises with enough effort to increase her heart rate 10 to 19 minutes per day.  She exercises with enough effort to increase her heart rate 4 days per week.   She is taking medications regularly.      2 ay hx of ST with fever, chills.  No nausea, vomiting. No uri sx.   No known exposures. Able to take po fluids well.   No rash.      Review of Systems   HENT:  Positive for sore throat.      Constitutional, HEENT, cardiovascular, pulmonary, gi and gu systems are negative, except as otherwise noted.      Objective    /86   Pulse 107   Temp 100.2  F (37.9  C)   Resp 18   Ht 1.74 m (5' 8.5\")   Wt 115.3 kg (254 lb 4.8 oz)   LMP 05/26/2024   SpO2 98%   BMI 38.10 kg/m    Physical Exam   Pt is in no acute distress and appears well  Ears patent B:  TM s intact, non-injected. All land marks easily visibile    Nasal mucosa is non-edematous, no discharge.    Pharynx: erythematous, tonsils non hypertrophied, No exudate   Neck supple: no adenopathy  Lungs: CTA  Heart: RRR, no murmur, no thrills or heaves   Ext: no edema  Skin: no rashes         Results for orders placed or performed in visit on 05/29/24 "   Streptococcus A Rapid Screen w/Reflex to PCR - Clinic Collect     Status: Abnormal    Specimen: Throat; Swab   Result Value Ref Range    Group A Strep antigen Positive (A) Negative

## 2024-09-07 ENCOUNTER — HEALTH MAINTENANCE LETTER (OUTPATIENT)
Age: 44
End: 2024-09-07

## 2024-10-14 SDOH — HEALTH STABILITY: PHYSICAL HEALTH: ON AVERAGE, HOW MANY DAYS PER WEEK DO YOU ENGAGE IN MODERATE TO STRENUOUS EXERCISE (LIKE A BRISK WALK)?: 0 DAYS

## 2024-10-14 SDOH — HEALTH STABILITY: PHYSICAL HEALTH: ON AVERAGE, HOW MANY MINUTES DO YOU ENGAGE IN EXERCISE AT THIS LEVEL?: 0 MIN

## 2024-10-14 ASSESSMENT — SOCIAL DETERMINANTS OF HEALTH (SDOH): HOW OFTEN DO YOU GET TOGETHER WITH FRIENDS OR RELATIVES?: ONCE A WEEK

## 2024-10-18 ENCOUNTER — OFFICE VISIT (OUTPATIENT)
Dept: FAMILY MEDICINE | Facility: CLINIC | Age: 44
End: 2024-10-18
Payer: COMMERCIAL

## 2024-10-18 VITALS
WEIGHT: 255 LBS | HEART RATE: 64 BPM | OXYGEN SATURATION: 98 % | TEMPERATURE: 97.9 F | HEIGHT: 68 IN | DIASTOLIC BLOOD PRESSURE: 87 MMHG | BODY MASS INDEX: 38.65 KG/M2 | SYSTOLIC BLOOD PRESSURE: 127 MMHG | RESPIRATION RATE: 20 BRPM

## 2024-10-18 DIAGNOSIS — Z00.00 ENCOUNTER FOR PREVENTATIVE ADULT HEALTH CARE EXAMINATION: Primary | ICD-10-CM

## 2024-10-18 DIAGNOSIS — Z12.4 CERVICAL CANCER SCREENING: ICD-10-CM

## 2024-10-18 PROCEDURE — 99396 PREV VISIT EST AGE 40-64: CPT | Mod: 25

## 2024-10-18 PROCEDURE — G0145 SCR C/V CYTO,THINLAYER,RESCR: HCPCS

## 2024-10-18 PROCEDURE — 90480 ADMN SARSCOV2 VAC 1/ONLY CMP: CPT

## 2024-10-18 PROCEDURE — 90656 IIV3 VACC NO PRSV 0.5 ML IM: CPT

## 2024-10-18 PROCEDURE — 99459 PELVIC EXAMINATION: CPT

## 2024-10-18 PROCEDURE — 87624 HPV HI-RISK TYP POOLED RSLT: CPT

## 2024-10-18 PROCEDURE — 91320 SARSCV2 VAC 30MCG TRS-SUC IM: CPT

## 2024-10-18 PROCEDURE — 90471 IMMUNIZATION ADMIN: CPT

## 2024-10-18 NOTE — PROGRESS NOTES
"Preventive Care Visit  [facility]  Jessicabishnu DanielSYLVIA larsen CNP, Family Medicine  Oct 18, 2024      Assessment & Plan     Encounter for preventative adult health care examination  Without abnormal findings.  Pap smear done today.    - HPV and Gynecologic Cytology Panel - Recommended Age 30 - 65 Years  - Gynecologic Cytology (PAP)    Cervical cancer screening  Normal exam, patient to be notified of results and plan of care adjusted as needed.            BMI  Estimated body mass index is 38.49 kg/m  as calculated from the following:    Height as of this encounter: 1.734 m (5' 8.25\").    Weight as of this encounter: 115.7 kg (255 lb).       Counseling  Appropriate preventive services were addressed with this patient via screening, questionnaire, or discussion as appropriate for fall prevention, nutrition, physical activity, Tobacco-use cessation, social engagement, weight loss and cognition.  Checklist reviewing preventive services available has been given to the patient.          Annemarie Manuel is a 44 year old, presenting for the following:  Physical (Pt here for Px.  Pt is not fasting today)        10/18/2024     2:17 PM   Additional Questions   Roomed by [facility]        Health Care Directive  Patient does not have a Health Care Directive or Living Will: Discussed advance care planning with patient; information given to patient to review.    HPI              10/14/2024   General Health   How would you rate your overall physical health? Good   Feel stress (tense, anxious, or unable to sleep) Not at all            10/14/2024   Nutrition   Three or more servings of calcium each day? (!) NO   Diet: Regular (no restrictions)   How many servings of fruit and vegetables per day? (!) 2-3   How many sweetened beverages each day? 0-1            10/14/2024   Exercise   Days per week of moderate/strenous exercise 0 days   Average minutes spent exercising at this level 0 min      (!) EXERCISE " CONCERN      10/14/2024   Social Factors   Frequency of gathering with friends or relatives Once a week   Worry food won't last until get money to buy more No   Food not last or not have enough money for food? No   Do you have housing? (Housing is defined as stable permanent housing and does not include staying ouside in a car, in a tent, in an abandoned building, in an overnight shelter, or couch-surfing.) Yes   Are you worried about losing your housing? No   Lack of transportation? No   Unable to get utilities (heat,electricity)? No            10/14/2024   Dental   Dentist two times every year? Yes            10/14/2024   TB Screening   Were you born outside of the [place]? No            Today's PHQ-2 Score:       10/18/2024     2:13 PM   PHQ-2 (  [employer])   Q1: Little interest or pleasure in doing things 0   Q2: Feeling down, depressed or hopeless 0   PHQ-2 Score 0   Q1: Little interest or pleasure in doing things Not at all   Q2: Feeling down, depressed or hopeless Not at all   PHQ-2 Score 0           10/14/2024   Substance Use   Alcohol more than 3/day or more than 7/wk No   Do you use any other substances recreationally? No        Social History     Tobacco Use    Smoking status: Former     Current packs/day: 0.00     Average packs/day: 0.3 packs/day for 2.0 years (0.5 ttl pk-yrs)     Types: Cigarettes     Start date: 1998     Quit date: 2000     Years since quittin.6    Smokeless tobacco: Never   Vaping Use    Vaping status: Never Used   Substance Use Topics    Alcohol use: No    Drug use: No                  10/14/2024   STI Screening   New sexual partner(s) since last STI/HIV test? No        History of abnormal Pap smear: No - age 30-64 HPV with reflex Pap every 5 years recommended        Latest Ref Rng & Units 2019     5:21 PM   PAP / HPV   PAP Negative for squamous intraepithelial lesion or malignancy. Negative for squamous intraepithelial lesion or malignancy  Electronically signed by  "Nicolette Orosco, CT (ASCP) on 7/24/2019 at 10:36 AM      HPV 16 DNA NEG Negative    HPV 18 DNA NEG Negative    Other HR HPV NEG Negative      ASCVD Risk   The ASCVD Risk score (Alejandra VELEZ, et al., 2019) failed to calculate for the following reasons:    Cannot find a previous HDL lab    Cannot find a previous total cholesterol lab        10/14/2024   Contraception/Family Planning   Questions about contraception or family planning No           Reviewed and updated as needed this visit by Provider                             Objective    Exam  /87 (BP Location: Right arm, Patient Position: Sitting, Cuff Size: Adult Large)   Pulse 64   Temp 97.9  F (36.6  C) (Tympanic)   Resp 20   Ht 1.734 m (5' 8.25\")   Wt 115.7 kg (255 lb)   LMP 09/29/2024 (Approximate)   SpO2 98%   BMI 38.49 kg/m     Estimated body mass index is 38.49 kg/m  as calculated from the following:    Height as of this encounter: 1.734 m (5' 8.25\").    Weight as of this encounter: 115.7 kg (255 lb).    Physical Exam  Genitourinary:     Labia:         Right: No rash.         Left: No rash.       Vagina: Normal.      Cervix: Normal.       GENERAL: alert and no distress  EYES: Eyes grossly normal to inspection, PERRL and conjunctivae and sclerae normal  HENT: ear canals and TM's normal, nose and mouth without ulcers or lesions  NECK: no adenopathy, no asymmetry, masses, or scars  RESP: lungs clear to auscultation - no rales, rhonchi or wheezes  CV: regular rate and rhythm, normal S1 S2, no S3 or S4, no murmur, click or rub, no peripheral edema  ABDOMEN: soft, nontender, no hepatosplenomegaly, no masses and bowel sounds normal  MS: no gross musculoskeletal defects noted, no edema  SKIN: no suspicious lesions or rashes  NEURO: Normal strength and tone, mentation intact and speech normal  PSYCH: mentation appears normal, affect normal/bright        Signed Electronically by: SYLVIA Winn CNP    "

## 2024-10-21 LAB
HPV HR 12 DNA CVX QL NAA+PROBE: NEGATIVE
HPV16 DNA CVX QL NAA+PROBE: NEGATIVE
HPV18 DNA CVX QL NAA+PROBE: NEGATIVE
HUMAN PAPILLOMA VIRUS FINAL DIAGNOSIS: NORMAL

## 2024-10-24 ENCOUNTER — ANCILLARY PROCEDURE (OUTPATIENT)
Dept: MAMMOGRAPHY | Facility: CLINIC | Age: 44
End: 2024-10-24
Attending: PHYSICIAN ASSISTANT
Payer: COMMERCIAL

## 2024-10-24 DIAGNOSIS — Z12.31 VISIT FOR SCREENING MAMMOGRAM: ICD-10-CM

## 2024-10-24 LAB
BKR AP ASSOCIATED HPV REPORT: NORMAL
BKR LAB AP GYN ADEQUACY: NORMAL
BKR LAB AP GYN INTERPRETATION: NORMAL
BKR LAB AP PREVIOUS ABNORMAL: NORMAL
PATH REPORT.COMMENTS IMP SPEC: NORMAL
PATH REPORT.COMMENTS IMP SPEC: NORMAL
PATH REPORT.RELEVANT HX SPEC: NORMAL

## 2024-10-24 PROCEDURE — 77063 BREAST TOMOSYNTHESIS BI: CPT | Mod: TC | Performed by: RADIOLOGY

## 2024-10-24 PROCEDURE — 77067 SCR MAMMO BI INCL CAD: CPT | Mod: TC | Performed by: RADIOLOGY

## 2025-02-24 ENCOUNTER — E-VISIT (OUTPATIENT)
Dept: URGENT CARE | Facility: CLINIC | Age: 45
End: 2025-02-24
Payer: COMMERCIAL

## 2025-02-24 DIAGNOSIS — L71.0 PERIORAL DERMATITIS: Primary | ICD-10-CM

## 2025-02-24 RX ORDER — MUPIROCIN 20 MG/G
OINTMENT TOPICAL 3 TIMES DAILY
Qty: 22 G | Refills: 0 | Status: SHIPPED | OUTPATIENT
Start: 2025-02-24 | End: 2025-03-03

## 2025-02-24 NOTE — PATIENT INSTRUCTIONS
Dear Mar Kaur    This looks like it could be perioral dermatitis.  I am sending in a prescription for mupirocin ointment which is an antibiotic ointment.  Use this 3 times a day for a week and if it is not better, I recommend being seen in person.    Thanks for choosing us as your health care partner,    Samantha Mcdowell, CNP

## 2025-03-16 ENCOUNTER — VIRTUAL VISIT (OUTPATIENT)
Dept: URGENT CARE | Facility: CLINIC | Age: 45
End: 2025-03-16
Payer: COMMERCIAL

## 2025-03-16 DIAGNOSIS — R21 RASH: ICD-10-CM

## 2025-03-16 DIAGNOSIS — L30.9 LIP LICKING DERMATITIS: Primary | ICD-10-CM

## 2025-03-16 PROCEDURE — 98005 SYNCH AUDIO-VIDEO EST LOW 20: CPT

## 2025-03-16 RX ORDER — DESONIDE 0.5 MG/G
CREAM TOPICAL 2 TIMES DAILY
Qty: 60 G | Refills: 0 | Status: SHIPPED | OUTPATIENT
Start: 2025-03-16

## 2025-03-17 NOTE — PROGRESS NOTES
Mar is a 44 year old female who presents for a billable video visit.    ASSESSMENT/PLAN:    ICD-10-CM    1. Lip licking dermatitis  L30.9 desonide (DESOWEN) 0.05 % external cream      2. Rash  R21 desonide (DESOWEN) 0.05 % external cream          Rash looks to be consistent with dermatitis and will trial low-dose steroid cream, desonide was sent to local pharmacy for trial twice daily for the next 7 to 14 days; side effect of medication was discussed with patient.  I have advised her to avoid use of lip balm's and ointments and use Vaseline or Aquaphor instead.        Follow up with primary care provider with any problems, questions or concerns or if symptoms worsen or fail to improve. Patient verbalized understanding and is agreeable to plan.       SUBJECTIVE:  Mar Kaur is a 44 year old who presents for a rash.  Onset of rash was 2 months ago.   Rash is still present.   Location of the rash: outside of lips  Quality/symptoms of rash: dry and red   Associated symptoms include: nothing.  Symptoms are moderate and rash seems to be not changing over the course of time.  Previous history of a similar rash? No  Treatment measures tried include:  topical antibiotic      Patient denies fever/chills, difficulty breathing, throat/tongue swelling, new meds, pets, foods, soaps, detergents, lotions, or enviornmental contacts.    Review of Systems  All systems reviewed and negative except per HPI.      OBJECTIVE:  Vitals not done due to this being a virtual visit    GENERAL: alert and no distress  EYES: Eyes grossly normal to inspection.  No discharge or erythema, or obvious scleral/conjunctival abnormalities.  RESP: No audible wheeze, cough, or visible cyanosis.    SKIN: Dry cracked erythematous rash around the lips.  No obvious blisters or drainage.  PSYCH: Appropriate affect, tone, and pace of words    Video-Visit Details    Type of service:  Video Visit  Video Start Time: 7:40 PM  Video End Time: 7:48 PM    Originating  Location (pt. Location): Home    Distant Location (provider location):  [facility]     Platform used for Video Visit: [employer]

## 2025-05-28 ENCOUNTER — OFFICE VISIT (OUTPATIENT)
Dept: FAMILY MEDICINE | Facility: CLINIC | Age: 45
End: 2025-05-28
Payer: COMMERCIAL

## 2025-05-28 VITALS
TEMPERATURE: 97.5 F | DIASTOLIC BLOOD PRESSURE: 82 MMHG | WEIGHT: 252 LBS | BODY MASS INDEX: 38.19 KG/M2 | SYSTOLIC BLOOD PRESSURE: 128 MMHG | HEART RATE: 63 BPM | RESPIRATION RATE: 16 BRPM | HEIGHT: 68 IN | OXYGEN SATURATION: 99 %

## 2025-05-28 DIAGNOSIS — N92.6 LATE MENSES: ICD-10-CM

## 2025-05-28 DIAGNOSIS — R00.2 PALPITATIONS: Primary | ICD-10-CM

## 2025-05-28 LAB
ERYTHROCYTE [DISTWIDTH] IN BLOOD BY AUTOMATED COUNT: 12.6 % (ref 10–15)
HCT VFR BLD AUTO: 39.2 % (ref 35–47)
HGB BLD-MCNC: 13.6 G/DL (ref 11.7–15.7)
MCH RBC QN AUTO: 31.8 PG (ref 26.5–33)
MCHC RBC AUTO-ENTMCNC: 34.7 G/DL (ref 31.5–36.5)
MCV RBC AUTO: 92 FL (ref 78–100)
PLATELET # BLD AUTO: 274 10E3/UL (ref 150–450)
RBC # BLD AUTO: 4.28 10E6/UL (ref 3.8–5.2)
WBC # BLD AUTO: 7.7 10E3/UL (ref 4–11)

## 2025-05-28 PROCEDURE — 93000 ELECTROCARDIOGRAM COMPLETE: CPT

## 2025-05-28 PROCEDURE — 3074F SYST BP LT 130 MM HG: CPT

## 2025-05-28 PROCEDURE — 83540 ASSAY OF IRON: CPT

## 2025-05-28 PROCEDURE — 84443 ASSAY THYROID STIM HORMONE: CPT

## 2025-05-28 PROCEDURE — 3079F DIAST BP 80-89 MM HG: CPT

## 2025-05-28 PROCEDURE — 83550 IRON BINDING TEST: CPT

## 2025-05-28 PROCEDURE — 80048 BASIC METABOLIC PNL TOTAL CA: CPT

## 2025-05-28 PROCEDURE — 85027 COMPLETE CBC AUTOMATED: CPT

## 2025-05-28 PROCEDURE — 82728 ASSAY OF FERRITIN: CPT

## 2025-05-28 PROCEDURE — 99213 OFFICE O/P EST LOW 20 MIN: CPT

## 2025-05-28 PROCEDURE — 36415 COLL VENOUS BLD VENIPUNCTURE: CPT

## 2025-05-28 ASSESSMENT — PATIENT HEALTH QUESTIONNAIRE - PHQ9
SUM OF ALL RESPONSES TO PHQ QUESTIONS 1-9: 9
10. IF YOU CHECKED OFF ANY PROBLEMS, HOW DIFFICULT HAVE THESE PROBLEMS MADE IT FOR YOU TO DO YOUR WORK, TAKE CARE OF THINGS AT HOME, OR GET ALONG WITH OTHER PEOPLE: SOMEWHAT DIFFICULT
SUM OF ALL RESPONSES TO PHQ QUESTIONS 1-9: 9

## 2025-05-28 ASSESSMENT — ENCOUNTER SYMPTOMS: FATIGUE: 1

## 2025-05-28 NOTE — PROGRESS NOTES
"  Assessment & Plan     Palpitations  Has heart palpitations long-term but have been increasing and occurring every few minutes over the past week.  They are not accompanied by any other symptoms, no shortness of breath, no chest pain, no dizziness.  Patient has not had these palpitations previously evaluated.  Lab workup today, EKG shows normal sinus rhythm in clinic.  If no abnormalities on labs that may be contributing to this palpitations will send out Zio patch, address confirmed and patient aware of plan  - EKG 12-lead complete w/read - Clinics  - CBC with platelets; Future  - TSH with free T4 reflex; Future  - Ferritin; Future  - Iron and iron binding capacity; Future  - Basic metabolic panel  (Ca, Cl, CO2, Creat, Gluc, K, Na, BUN); Future  - CBC with platelets  - TSH with free T4 reflex  - Ferritin  - Iron and iron binding capacity  - Basic metabolic panel  (Ca, Cl, CO2, Creat, Gluc, K, Na, BUN)    Late menses  Patient with late menses, will do UPT today.  - HCG qualitative urine POCT, Enter/Edit Result          BMI  Estimated body mass index is 38.04 kg/m  as calculated from the following:    Height as of this encounter: 1.734 m (5' 8.25\").    Weight as of this encounter: 114.3 kg (252 lb).             Annemarie Manuel is a 44 year old, presenting for the following health issues:  Palpitations (Fluttering heart worsening over the last week. Every few minutes.)      2025    12:50 PM   Additional Questions   Roomed by ronald ybarra   Accompanied by self     Has had palpitations throughout adult life, once a month or two, but now happening every several minutes.     Used  condom on 4/10 and then took Plan B     Had menses early, slightly early. Now has not had menses    Heart Problem  This is a recurrent problem. The current episode started in the past 7 days. The problem occurs constantly. The problem has been gradually worsening. Associated symptoms include fatigue.   History of Present Illness " "      Reason for visit:  Increase in heart palpitations.  Symptom onset:  3-7 days ago  Symptoms include:  Increase in heart palpitations.  Symptom intensity:  Mild  Symptom progression:  Worsening  Had these symptoms before:  Yes  Has tried/received treatment for these symptoms:  No  What makes it worse:  No  What makes it better:  No   She is taking medications regularly.                      Objective    /82 (BP Location: Right arm, Patient Position: Sitting)   Pulse 63   Temp 97.5  F (36.4  C) (Tympanic)   Resp 16   Ht 1.734 m (5' 8.25\")   Wt 114.3 kg (252 lb)   LMP 04/15/2025 (Approximate)   SpO2 99%   Breastfeeding No   BMI 38.04 kg/m    Body mass index is 38.04 kg/m .  Physical Exam   GENERAL: alert and no distress  NECK: no adenopathy, no asymmetry, masses, or scars  RESP: lungs clear to auscultation - no rales, rhonchi or wheezes  CV: regular rate and rhythm, normal S1 S2, no S3 or S4, no murmur, click or rub, no peripheral edema  ABDOMEN: soft, nontender, no hepatosplenomegaly, no masses and bowel sounds normal  MS: no gross musculoskeletal defects noted, no edema            Signed Electronically by: SYLVIA Winn CNP    "

## 2025-05-29 LAB
ANION GAP SERPL CALCULATED.3IONS-SCNC: 14 MMOL/L (ref 7–15)
BUN SERPL-MCNC: 7.5 MG/DL (ref 6–20)
CALCIUM SERPL-MCNC: 9.1 MG/DL (ref 8.8–10.4)
CHLORIDE SERPL-SCNC: 102 MMOL/L (ref 98–107)
CREAT SERPL-MCNC: 0.65 MG/DL (ref 0.51–0.95)
EGFRCR SERPLBLD CKD-EPI 2021: >90 ML/MIN/1.73M2
FERRITIN SERPL-MCNC: 39 NG/ML (ref 6–175)
GLUCOSE SERPL-MCNC: 86 MG/DL (ref 70–99)
HCO3 SERPL-SCNC: 22 MMOL/L (ref 22–29)
IRON BINDING CAPACITY (ROCHE): 266 UG/DL (ref 240–430)
IRON SATN MFR SERPL: 34 % (ref 15–46)
IRON SERPL-MCNC: 91 UG/DL (ref 37–145)
POTASSIUM SERPL-SCNC: 4.8 MMOL/L (ref 3.4–5.3)
SODIUM SERPL-SCNC: 138 MMOL/L (ref 135–145)
TSH SERPL DL<=0.005 MIU/L-ACNC: 0.96 UIU/ML (ref 0.3–4.2)

## 2025-05-30 ENCOUNTER — ORDERS ONLY (AUTO-RELEASED) (OUTPATIENT)
Dept: FAMILY MEDICINE | Facility: CLINIC | Age: 45
End: 2025-05-30
Payer: COMMERCIAL

## 2025-05-30 ENCOUNTER — RESULTS FOLLOW-UP (OUTPATIENT)
Dept: FAMILY MEDICINE | Facility: CLINIC | Age: 45
End: 2025-05-30

## 2025-05-30 DIAGNOSIS — R00.2 PALPITATIONS: Primary | ICD-10-CM

## 2025-05-30 DIAGNOSIS — R00.2 PALPITATIONS: ICD-10-CM

## 2025-06-19 LAB — CV ZIO PRELIM RESULTS: NORMAL

## 2025-06-21 LAB — CV ZIO PRELIM RESULTS: NORMAL

## 2025-06-24 ENCOUNTER — RESULTS FOLLOW-UP (OUTPATIENT)
Dept: FAMILY MEDICINE | Facility: CLINIC | Age: 45
End: 2025-06-24

## 2025-08-17 ENCOUNTER — MYC REFILL (OUTPATIENT)
Dept: FAMILY MEDICINE | Facility: CLINIC | Age: 45
End: 2025-08-17
Payer: COMMERCIAL

## 2025-08-17 DIAGNOSIS — F41.9 ANXIETY: ICD-10-CM

## 2025-08-18 RX ORDER — HYDROXYZINE HYDROCHLORIDE 25 MG/1
25 TABLET, FILM COATED ORAL 2 TIMES DAILY PRN
Qty: 30 TABLET | Refills: 0 | Status: SHIPPED | OUTPATIENT
Start: 2025-08-18

## 2025-08-18 RX ORDER — PROPRANOLOL HYDROCHLORIDE 10 MG/1
10 TABLET ORAL 2 TIMES DAILY PRN
Qty: 30 TABLET | Refills: 1 | Status: SHIPPED | OUTPATIENT
Start: 2025-08-18